# Patient Record
Sex: FEMALE | Race: BLACK OR AFRICAN AMERICAN | NOT HISPANIC OR LATINO
[De-identification: names, ages, dates, MRNs, and addresses within clinical notes are randomized per-mention and may not be internally consistent; named-entity substitution may affect disease eponyms.]

---

## 2017-03-02 ENCOUNTER — APPOINTMENT (OUTPATIENT)
Dept: INTERNAL MEDICINE | Facility: CLINIC | Age: 42
End: 2017-03-02

## 2017-03-02 VITALS
BODY MASS INDEX: 34.66 KG/M2 | OXYGEN SATURATION: 98 % | DIASTOLIC BLOOD PRESSURE: 84 MMHG | HEIGHT: 65 IN | WEIGHT: 208 LBS | SYSTOLIC BLOOD PRESSURE: 140 MMHG | HEART RATE: 80 BPM

## 2017-03-02 DIAGNOSIS — R51 HEADACHE: ICD-10-CM

## 2017-03-02 DIAGNOSIS — Z00.00 ENCOUNTER FOR GENERAL ADULT MEDICAL EXAMINATION W/OUT ABNORMAL FINDINGS: ICD-10-CM

## 2017-03-02 DIAGNOSIS — D64.9 ANEMIA, UNSPECIFIED: ICD-10-CM

## 2017-03-02 DIAGNOSIS — R10.33 PERIUMBILICAL PAIN: ICD-10-CM

## 2017-03-02 DIAGNOSIS — E07.89 OTHER SPECIFIED DISORDERS OF THYROID: ICD-10-CM

## 2017-03-02 DIAGNOSIS — M53.3 SACROCOCCYGEAL DISORDERS, NOT ELSEWHERE CLASSIFIED: ICD-10-CM

## 2017-03-04 PROBLEM — R51 HEADACHE: Status: ACTIVE | Noted: 2017-03-02

## 2017-03-04 LAB
25(OH)D3 SERPL-MCNC: 23.3 NG/ML
ALBUMIN SERPL ELPH-MCNC: 4.2 G/DL
ALP BLD-CCNC: 98 U/L
ALT SERPL-CCNC: 12 U/L
ANION GAP SERPL CALC-SCNC: 14 MMOL/L
AST SERPL-CCNC: 16 U/L
BILIRUB SERPL-MCNC: 0.3 MG/DL
BUN SERPL-MCNC: 12 MG/DL
CALCIUM SERPL-MCNC: 9.5 MG/DL
CHLORIDE SERPL-SCNC: 102 MMOL/L
CHOLEST SERPL-MCNC: 160 MG/DL
CHOLEST/HDLC SERPL: 3.3 RATIO
CO2 SERPL-SCNC: 22 MMOL/L
CREAT SERPL-MCNC: 0.88 MG/DL
GLUCOSE SERPL-MCNC: 91 MG/DL
HBA1C MFR BLD HPLC: 5.6 %
HDLC SERPL-MCNC: 48 MG/DL
HIV1+2 AB SPEC QL IA.RAPID: NONREACTIVE
LDLC SERPL CALC-MCNC: 94 MG/DL
POTASSIUM SERPL-SCNC: 4.5 MMOL/L
PROT SERPL-MCNC: 8.1 G/DL
SODIUM SERPL-SCNC: 138 MMOL/L
TRIGL SERPL-MCNC: 91 MG/DL
TSH SERPL-ACNC: 2.41 UIU/ML

## 2017-05-05 ENCOUNTER — APPOINTMENT (OUTPATIENT)
Dept: INTERNAL MEDICINE | Facility: CLINIC | Age: 42
End: 2017-05-05

## 2017-05-05 DIAGNOSIS — E55.9 VITAMIN D DEFICIENCY, UNSPECIFIED: ICD-10-CM

## 2018-04-24 ENCOUNTER — APPOINTMENT (OUTPATIENT)
Dept: OBGYN | Facility: CLINIC | Age: 43
End: 2018-04-24
Payer: COMMERCIAL

## 2018-04-24 VITALS
WEIGHT: 211.2 LBS | DIASTOLIC BLOOD PRESSURE: 76 MMHG | HEIGHT: 65 IN | SYSTOLIC BLOOD PRESSURE: 114 MMHG | BODY MASS INDEX: 35.19 KG/M2 | HEART RATE: 94 BPM

## 2018-04-24 PROCEDURE — 99213 OFFICE O/P EST LOW 20 MIN: CPT

## 2018-04-25 LAB
ALBUMIN SERPL ELPH-MCNC: 4.2 G/DL
ALP BLD-CCNC: 86 U/L
ALT SERPL-CCNC: 31 U/L
ANION GAP SERPL CALC-SCNC: 16 MMOL/L
AST SERPL-CCNC: 44 U/L
BILIRUB SERPL-MCNC: 0.4 MG/DL
BUN SERPL-MCNC: 14 MG/DL
CALCIUM SERPL-MCNC: 9.3 MG/DL
CHLORIDE SERPL-SCNC: 97 MMOL/L
CO2 SERPL-SCNC: 27 MMOL/L
CREAT SERPL-MCNC: 0.88 MG/DL
GLUCOSE SERPL-MCNC: 60 MG/DL
HBA1C MFR BLD HPLC: 5.6 %
POTASSIUM SERPL-SCNC: 4.4 MMOL/L
PROT SERPL-MCNC: 8.3 G/DL
SODIUM SERPL-SCNC: 140 MMOL/L

## 2018-04-30 ENCOUNTER — RESULT REVIEW (OUTPATIENT)
Age: 43
End: 2018-04-30

## 2018-04-30 LAB — BACTERIA UR CULT: ABNORMAL

## 2018-05-23 ENCOUNTER — APPOINTMENT (OUTPATIENT)
Dept: OBGYN | Facility: CLINIC | Age: 43
End: 2018-05-23
Payer: COMMERCIAL

## 2018-05-23 VITALS
SYSTOLIC BLOOD PRESSURE: 111 MMHG | HEART RATE: 83 BPM | DIASTOLIC BLOOD PRESSURE: 74 MMHG | BODY MASS INDEX: 34.16 KG/M2 | WEIGHT: 205 LBS | HEIGHT: 65 IN

## 2018-05-23 DIAGNOSIS — Z01.419 ENCOUNTER FOR GYNECOLOGICAL EXAMINATION (GENERAL) (ROUTINE) W/OUT ABNORMAL FINDINGS: ICD-10-CM

## 2018-05-23 DIAGNOSIS — N39.0 URINARY TRACT INFECTION, SITE NOT SPECIFIED: ICD-10-CM

## 2018-05-23 DIAGNOSIS — N63.10 UNSPECIFIED LUMP IN THE RIGHT BREAST, UNSPECIFIED QUADRANT: ICD-10-CM

## 2018-05-23 PROCEDURE — 99396 PREV VISIT EST AGE 40-64: CPT

## 2018-05-25 LAB — HPV HIGH+LOW RISK DNA PNL CVX: NOT DETECTED

## 2018-05-26 ENCOUNTER — APPOINTMENT (OUTPATIENT)
Dept: MAMMOGRAPHY | Facility: IMAGING CENTER | Age: 43
End: 2018-05-26

## 2018-05-26 ENCOUNTER — APPOINTMENT (OUTPATIENT)
Dept: ULTRASOUND IMAGING | Facility: IMAGING CENTER | Age: 43
End: 2018-05-26

## 2018-05-29 ENCOUNTER — APPOINTMENT (OUTPATIENT)
Dept: ULTRASOUND IMAGING | Facility: IMAGING CENTER | Age: 43
End: 2018-05-29
Payer: COMMERCIAL

## 2018-05-29 ENCOUNTER — APPOINTMENT (OUTPATIENT)
Dept: RADIOLOGY | Facility: IMAGING CENTER | Age: 43
End: 2018-05-29
Payer: COMMERCIAL

## 2018-05-29 ENCOUNTER — OUTPATIENT (OUTPATIENT)
Dept: OUTPATIENT SERVICES | Facility: HOSPITAL | Age: 43
LOS: 1 days | End: 2018-05-29
Payer: COMMERCIAL

## 2018-05-29 DIAGNOSIS — Z98.89 OTHER SPECIFIED POSTPROCEDURAL STATES: Chronic | ICD-10-CM

## 2018-05-29 DIAGNOSIS — Z00.8 ENCOUNTER FOR OTHER GENERAL EXAMINATION: ICD-10-CM

## 2018-05-29 LAB
BACTERIA UR CULT: ABNORMAL
CYTOLOGY CVX/VAG DOC THIN PREP: NORMAL

## 2018-05-29 PROCEDURE — 72100 X-RAY EXAM L-S SPINE 2/3 VWS: CPT | Mod: 26

## 2018-05-29 PROCEDURE — 76700 US EXAM ABDOM COMPLETE: CPT | Mod: 26

## 2018-05-29 PROCEDURE — 76536 US EXAM OF HEAD AND NECK: CPT | Mod: 26

## 2018-05-29 PROCEDURE — 76700 US EXAM ABDOM COMPLETE: CPT

## 2018-05-29 PROCEDURE — 76536 US EXAM OF HEAD AND NECK: CPT

## 2018-05-29 PROCEDURE — 72100 X-RAY EXAM L-S SPINE 2/3 VWS: CPT

## 2018-06-01 ENCOUNTER — OTHER (OUTPATIENT)
Age: 43
End: 2018-06-01

## 2019-03-21 ENCOUNTER — INPATIENT (INPATIENT)
Facility: HOSPITAL | Age: 44
LOS: 2 days | Discharge: HOME | End: 2019-03-24
Attending: HOSPITALIST | Admitting: HOSPITALIST
Payer: COMMERCIAL

## 2019-03-21 VITALS
DIASTOLIC BLOOD PRESSURE: 70 MMHG | HEART RATE: 108 BPM | SYSTOLIC BLOOD PRESSURE: 104 MMHG | RESPIRATION RATE: 17 BRPM | TEMPERATURE: 99 F | OXYGEN SATURATION: 98 %

## 2019-03-21 DIAGNOSIS — Z98.89 OTHER SPECIFIED POSTPROCEDURAL STATES: Chronic | ICD-10-CM

## 2019-03-21 LAB
ALBUMIN SERPL ELPH-MCNC: 4 G/DL — SIGNIFICANT CHANGE UP (ref 3.3–5)
ALP SERPL-CCNC: 99 U/L — SIGNIFICANT CHANGE UP (ref 40–120)
ALT FLD-CCNC: 18 U/L — SIGNIFICANT CHANGE UP (ref 4–33)
ANION GAP SERPL CALC-SCNC: 11 MMO/L — SIGNIFICANT CHANGE UP (ref 7–14)
ANISOCYTOSIS BLD QL: SLIGHT — SIGNIFICANT CHANGE UP
APPEARANCE UR: CLEAR — SIGNIFICANT CHANGE UP
AST SERPL-CCNC: 22 U/L — SIGNIFICANT CHANGE UP (ref 4–32)
BACTERIA # UR AUTO: HIGH
BASE EXCESS BLDV CALC-SCNC: 2.1 MMOL/L — SIGNIFICANT CHANGE UP
BASOPHILS # BLD AUTO: 0.02 K/UL — SIGNIFICANT CHANGE UP (ref 0–0.2)
BASOPHILS NFR BLD AUTO: 1 % — SIGNIFICANT CHANGE UP (ref 0–2)
BASOPHILS NFR SPEC: 1.7 % — SIGNIFICANT CHANGE UP (ref 0–2)
BILIRUB SERPL-MCNC: 0.4 MG/DL — SIGNIFICANT CHANGE UP (ref 0.2–1.2)
BILIRUB UR-MCNC: NEGATIVE — SIGNIFICANT CHANGE UP
BLASTS # FLD: 0 % — SIGNIFICANT CHANGE UP (ref 0–0)
BLOOD GAS VENOUS - CREATININE: 0.96 MG/DL — SIGNIFICANT CHANGE UP (ref 0.5–1.3)
BLOOD UR QL VISUAL: HIGH
BUN SERPL-MCNC: 11 MG/DL — SIGNIFICANT CHANGE UP (ref 7–23)
CALCIUM SERPL-MCNC: 9.2 MG/DL — SIGNIFICANT CHANGE UP (ref 8.4–10.5)
CHLORIDE BLDV-SCNC: 102 MMOL/L — SIGNIFICANT CHANGE UP (ref 96–108)
CHLORIDE SERPL-SCNC: 101 MMOL/L — SIGNIFICANT CHANGE UP (ref 98–107)
CO2 SERPL-SCNC: 24 MMOL/L — SIGNIFICANT CHANGE UP (ref 22–31)
COLOR SPEC: YELLOW — SIGNIFICANT CHANGE UP
CREAT SERPL-MCNC: 1.01 MG/DL — SIGNIFICANT CHANGE UP (ref 0.5–1.3)
EOSINOPHIL # BLD AUTO: 0.02 K/UL — SIGNIFICANT CHANGE UP (ref 0–0.5)
EOSINOPHIL NFR BLD AUTO: 1 % — SIGNIFICANT CHANGE UP (ref 0–6)
EOSINOPHIL NFR FLD: 1.8 % — SIGNIFICANT CHANGE UP (ref 0–6)
FLU A RESULT: NOT DETECTED — SIGNIFICANT CHANGE UP
FLU A RESULT: NOT DETECTED — SIGNIFICANT CHANGE UP
FLUAV AG NPH QL: NOT DETECTED — SIGNIFICANT CHANGE UP
FLUBV AG NPH QL: NOT DETECTED — SIGNIFICANT CHANGE UP
GAS PNL BLDV: 136 MMOL/L — SIGNIFICANT CHANGE UP (ref 136–146)
GIANT PLATELETS BLD QL SMEAR: PRESENT — SIGNIFICANT CHANGE UP
GLUCOSE BLDV-MCNC: 91 — SIGNIFICANT CHANGE UP (ref 70–99)
GLUCOSE SERPL-MCNC: 90 MG/DL — SIGNIFICANT CHANGE UP (ref 70–99)
GLUCOSE UR-MCNC: NEGATIVE — SIGNIFICANT CHANGE UP
HCO3 BLDV-SCNC: 25 MMOL/L — SIGNIFICANT CHANGE UP (ref 20–27)
HCT VFR BLD CALC: 39.2 % — SIGNIFICANT CHANGE UP (ref 34.5–45)
HCT VFR BLDV CALC: 38.7 % — SIGNIFICANT CHANGE UP (ref 34.5–45)
HGB BLD-MCNC: 12.1 G/DL — SIGNIFICANT CHANGE UP (ref 11.5–15.5)
HGB BLDV-MCNC: 12.6 G/DL — SIGNIFICANT CHANGE UP (ref 11.5–15.5)
HYPOCHROMIA BLD QL: SLIGHT — SIGNIFICANT CHANGE UP
IMM GRANULOCYTES NFR BLD AUTO: 0.5 % — SIGNIFICANT CHANGE UP (ref 0–1.5)
KETONES UR-MCNC: SIGNIFICANT CHANGE UP
LACTATE BLDV-MCNC: 1.1 MMOL/L — SIGNIFICANT CHANGE UP (ref 0.5–2)
LEUKOCYTE ESTERASE UR-ACNC: SIGNIFICANT CHANGE UP
LYMPHOCYTES # BLD AUTO: 0.47 K/UL — LOW (ref 1–3.3)
LYMPHOCYTES # BLD AUTO: 22.7 % — SIGNIFICANT CHANGE UP (ref 13–44)
LYMPHOCYTES NFR SPEC AUTO: 20 % — SIGNIFICANT CHANGE UP (ref 13–44)
MCHC RBC-ENTMCNC: 25.7 PG — LOW (ref 27–34)
MCHC RBC-ENTMCNC: 30.9 % — LOW (ref 32–36)
MCV RBC AUTO: 83.4 FL — SIGNIFICANT CHANGE UP (ref 80–100)
METAMYELOCYTES # FLD: 0 % — SIGNIFICANT CHANGE UP (ref 0–1)
MONOCYTES # BLD AUTO: 0.35 K/UL — SIGNIFICANT CHANGE UP (ref 0–0.9)
MONOCYTES NFR BLD AUTO: 16.9 % — HIGH (ref 2–14)
MONOCYTES NFR BLD: 16.5 % — HIGH (ref 2–9)
MYELOCYTES NFR BLD: 0 % — SIGNIFICANT CHANGE UP (ref 0–0)
NEUTROPHIL AB SER-ACNC: 54.8 % — SIGNIFICANT CHANGE UP (ref 43–77)
NEUTROPHILS # BLD AUTO: 1.2 K/UL — LOW (ref 1.8–7.4)
NEUTROPHILS NFR BLD AUTO: 57.9 % — SIGNIFICANT CHANGE UP (ref 43–77)
NEUTS BAND # BLD: 0 % — SIGNIFICANT CHANGE UP (ref 0–6)
NITRITE UR-MCNC: POSITIVE — HIGH
NRBC # FLD: 0 K/UL — LOW (ref 25–125)
OTHER - HEMATOLOGY %: 0 — SIGNIFICANT CHANGE UP
PCO2 BLDV: 48 MMHG — SIGNIFICANT CHANGE UP (ref 41–51)
PH BLDV: 7.37 PH — SIGNIFICANT CHANGE UP (ref 7.32–7.43)
PH UR: 6 — SIGNIFICANT CHANGE UP (ref 5–8)
PLATELET # BLD AUTO: 194 K/UL — SIGNIFICANT CHANGE UP (ref 150–400)
PLATELET COUNT - ESTIMATE: NORMAL — SIGNIFICANT CHANGE UP
PMV BLD: 10 FL — SIGNIFICANT CHANGE UP (ref 7–13)
PO2 BLDV: 38 MMHG — SIGNIFICANT CHANGE UP (ref 35–40)
POLYCHROMASIA BLD QL SMEAR: SLIGHT — SIGNIFICANT CHANGE UP
POTASSIUM BLDV-SCNC: 3.7 MMOL/L — SIGNIFICANT CHANGE UP (ref 3.4–4.5)
POTASSIUM SERPL-MCNC: 4.1 MMOL/L — SIGNIFICANT CHANGE UP (ref 3.5–5.3)
POTASSIUM SERPL-SCNC: 4.1 MMOL/L — SIGNIFICANT CHANGE UP (ref 3.5–5.3)
PROMYELOCYTES # FLD: 0 % — SIGNIFICANT CHANGE UP (ref 0–0)
PROT SERPL-MCNC: 8.1 G/DL — SIGNIFICANT CHANGE UP (ref 6–8.3)
PROT UR-MCNC: 20 — SIGNIFICANT CHANGE UP
RBC # BLD: 4.7 M/UL — SIGNIFICANT CHANGE UP (ref 3.8–5.2)
RBC # FLD: 13.4 % — SIGNIFICANT CHANGE UP (ref 10.3–14.5)
RBC CASTS # UR COMP ASSIST: SIGNIFICANT CHANGE UP (ref 0–?)
RSV RESULT: SIGNIFICANT CHANGE UP
RSV RNA RESP QL NAA+PROBE: SIGNIFICANT CHANGE UP
SAO2 % BLDV: 69.6 % — SIGNIFICANT CHANGE UP (ref 60–85)
SODIUM SERPL-SCNC: 136 MMOL/L — SIGNIFICANT CHANGE UP (ref 135–145)
SP GR SPEC: 1.02 — SIGNIFICANT CHANGE UP (ref 1–1.04)
SQUAMOUS # UR AUTO: SIGNIFICANT CHANGE UP
UROBILINOGEN FLD QL: NORMAL — SIGNIFICANT CHANGE UP
VARIANT LYMPHS # BLD: 5.2 % — SIGNIFICANT CHANGE UP
WBC # BLD: 2.07 K/UL — LOW (ref 3.8–10.5)
WBC # FLD AUTO: 2.07 K/UL — LOW (ref 3.8–10.5)
WBC UR QL: HIGH (ref 0–?)

## 2019-03-21 PROCEDURE — 71046 X-RAY EXAM CHEST 2 VIEWS: CPT | Mod: 26

## 2019-03-21 RX ORDER — CEFTRIAXONE 500 MG/1
1 INJECTION, POWDER, FOR SOLUTION INTRAMUSCULAR; INTRAVENOUS ONCE
Qty: 0 | Refills: 0 | Status: COMPLETED | OUTPATIENT
Start: 2019-03-21 | End: 2019-03-21

## 2019-03-21 RX ORDER — SODIUM CHLORIDE 9 MG/ML
1000 INJECTION INTRAMUSCULAR; INTRAVENOUS; SUBCUTANEOUS
Qty: 0 | Refills: 0 | Status: DISCONTINUED | OUTPATIENT
Start: 2019-03-21 | End: 2019-03-22

## 2019-03-21 RX ORDER — ACETAMINOPHEN 500 MG
650 TABLET ORAL ONCE
Qty: 0 | Refills: 0 | Status: COMPLETED | OUTPATIENT
Start: 2019-03-21 | End: 2019-03-21

## 2019-03-21 RX ORDER — CEFTRIAXONE 500 MG/1
1 INJECTION, POWDER, FOR SOLUTION INTRAMUSCULAR; INTRAVENOUS
Qty: 0 | Refills: 0 | Status: COMPLETED | OUTPATIENT
Start: 2019-03-22 | End: 2019-03-22

## 2019-03-21 RX ORDER — SODIUM CHLORIDE 9 MG/ML
1000 INJECTION INTRAMUSCULAR; INTRAVENOUS; SUBCUTANEOUS ONCE
Qty: 0 | Refills: 0 | Status: COMPLETED | OUTPATIENT
Start: 2019-03-21 | End: 2019-03-21

## 2019-03-21 RX ORDER — CEFTRIAXONE 500 MG/1
1 INJECTION, POWDER, FOR SOLUTION INTRAMUSCULAR; INTRAVENOUS EVERY 24 HOURS
Qty: 0 | Refills: 0 | Status: DISCONTINUED | OUTPATIENT
Start: 2019-03-21 | End: 2019-03-21

## 2019-03-21 RX ADMIN — CEFTRIAXONE 100 GRAM(S): 500 INJECTION, POWDER, FOR SOLUTION INTRAMUSCULAR; INTRAVENOUS at 17:10

## 2019-03-21 RX ADMIN — SODIUM CHLORIDE 1000 MILLILITER(S): 9 INJECTION INTRAMUSCULAR; INTRAVENOUS; SUBCUTANEOUS at 14:15

## 2019-03-21 RX ADMIN — Medication 650 MILLIGRAM(S): at 21:37

## 2019-03-21 RX ADMIN — Medication 650 MILLIGRAM(S): at 14:15

## 2019-03-21 RX ADMIN — SODIUM CHLORIDE 150 MILLILITER(S): 9 INJECTION INTRAMUSCULAR; INTRAVENOUS; SUBCUTANEOUS at 21:38

## 2019-03-21 NOTE — ED PROVIDER NOTE - PMH
Iron Deficiency Anemia    Irregular Menstruation    Menstrual disorder    Obesity (BMI 30.0-34.9)    Seasonal Allergies

## 2019-03-21 NOTE — ED PROVIDER NOTE - CLINICAL SUMMARY MEDICAL DECISION MAKING FREE TEXT BOX
Flu-like symptoms x 2 days & UTI symptoms x 1 week. History of chronic UTIs. Borderline febrile & tachycardic but HD stable and well-appearing. Will assess for UTI and flu. IVFs, Tylenol, and reassess.

## 2019-03-21 NOTE — ED ADULT TRIAGE NOTE - CHIEF COMPLAINT QUOTE
A&OX3 c/o flu like symptoms that began two days ago reports achiness in right shoulder, eyes, and headache pt also states that her urine smells and is being treated for it but has had no relief denies n/v/d cp sob fever chills

## 2019-03-21 NOTE — ED PROVIDER NOTE - OBJECTIVE STATEMENT
43F PMHx of hysterectomy for vaginal bleeding and chronic UTIs p/w subjective fever/chills/headache x 2 days and a dry cough that developed today. Denies any chest pain, SOB, neck stiffness, abdominal pain, N/V/D, numbness, weakness. Denies any sick contacts or travel history. Also of note, the has had chronic UTIs requiring multiple courses of antibiotics over the past couple of years. Reports chronic odorous urine and dysuria worsening over the past week.

## 2019-03-21 NOTE — ED PROVIDER NOTE - ATTENDING CONTRIBUTION TO CARE
Gong: I have seen and examined the patient face to face, have reviewed and addended the HPI, PE and a/p as necessary.     42 yo F with chronic UTIs a/w fever chills headache and lower back pain.  Pt reports having increasing lower abd pain pain/suprapubic pain, x 3 days with dysuria.  Reports having chills and fever over the past 2 days, now developed cough today.  Reports having headache that started yesterday and continued today with no nausea or vomiting, or dizziness, Reports chronic UTIs requiring multiple courses of antibiotics over the past couple of years. Reports chronic odorous urine and dysuria worsening over the past week.    GEN - uncomfortable appearing, febrile. CARD -s1s2, RRR, no M,G,R; PULM - CTA b/l, symmetric breath sounds; ABD:  +BS, TTP over lower abd, with lower back soreness, soft, no guarding, no rebound, no masses; BACK: no CVA tenderness, Normal  spine; EXT: symmetric pulses, 2+ dp, capillary refill < 2 seconds, no clubbing, no cyanosis, no edema NEURO: alert, CN 2-12 intact, reflexes nl, sensation nl, coordination nl, finger to nose nl, romberg negative, motor 5/5 RUE/LUE/RLE/LLE/EHL/Plantar flexion, no pronator drift, gait nl    42yo F a/w possibly pyelonephritis vs flu given febrile illness, will obtain blood cultures, cbc, cmp, ivf, lactate, will reassess, if no clinical improvement consider cdu for further observation; ceftriaxone if uti/pyelo.

## 2019-03-21 NOTE — ED CDU PROVIDER INITIAL DAY NOTE - ATTENDING CONTRIBUTION TO CARE
Bladimir: I have seen and examined the patient face to face, have reviewed and addended the HPI, PE and a/p as necessary.    42 yo F with chronic UTIs a/w fever chills headache and lower back pain.  Pt reports having increasing lower abd pain pain/suprapubic pain, x 3 days with dysuria.  Reports having chills and fever over the past 2 days, now developed cough today.  Reports having headache that started yesterday and continued today with no nausea or vomiting, or dizziness, Reports chronic UTIs requiring multiple courses of antibiotics over the past couple of years. Reports chronic odorous urine and dysuria worsening over the past week.    GEN - uncomfortable appearing, febrile. CARD -s1s2, RRR, no M,G,R; PULM - CTA b/l, symmetric breath sounds; ABD:  +BS, TTP over lower abd, with lower back soreness, soft, no guarding, no rebound, no masses; BACK: no CVA tenderness, Normal  spine; EXT: symmetric pulses, 2+ dp, capillary refill < 2 seconds, no clubbing, no cyanosis, no edema NEURO: alert, CN 2-12 intact, reflexes nl, sensation nl, coordination nl, finger to nose nl, romberg negative, motor 5/5 RUE/LUE/RLE/LLE/EHL/Plantar flexion, no pronator drift, gait nl    42yo F a/w possibly pyelonephritis vs flu given febrile illness, will obtain blood cultures, cbc, cmp, ivf, lactate, noted to have minimal improvement with IVF and IV antibiotics, found to have pyelonephritis.  Will admit to CDU for IV antibiotics and frequent clinical reassessments.

## 2019-03-21 NOTE — ED CDU PROVIDER INITIAL DAY NOTE - OBJECTIVE STATEMENT
43yF w/pmhx hysterectomy and chronic UTIs p/w flu like symptoms. Pt states over the past few days she has had subjective fever/chills, headache, lightheadedness and dry cough. Pt states she also has urinary frequency, dysuria and foul smelling urine, similar to prior UTIs. Pt has hx of headaches that are similar in nature to prior headaches. Also with acute on chronic low back pain. Pt denies recent travel, abdominal pain, nausea, vomiting, diarrhea, neck stiffness, dizziness, cp, sob or any other concerns.  In ED pt found to have UTI, still not feeling well (lightheaded/mild headache) sent to CDU for IV abx, am labs and symptom control

## 2019-03-21 NOTE — ED CDU PROVIDER INITIAL DAY NOTE - PSH
H/O  section    H/O dilation and curettage    H/O dilation and curettage    H/O unilateral oophorectomy  2005  Myomectomy   &   Tonsillectomy

## 2019-03-21 NOTE — ED CDU PROVIDER INITIAL DAY NOTE - MEDICAL DECISION MAKING DETAILS
43yF w/pmhx hysterectomy and chronic UTIs p/w flu like symptoms and urinary complaints. Found to have UTI, sent to CDU for IV abx and symptom control

## 2019-03-21 NOTE — ED PROVIDER NOTE - NS ED ROS FT
GENERAL: +F/C  EYES: no change in vision  HEENT: no trouble swallowing or speaking  CARDIAC: no chest pain  PULMONARY: +dry cough. no SOB  GI: no abdominal pain, no nausea, no vomiting, no diarrhea or constipation  : +acute on chronic odorous urine and dysuria   SKIN: no rashes  NEURO: +headache. no numbness, tingling, weakness  MSK: No joint pain     ~Rich Lau PGY1

## 2019-03-21 NOTE — ED PROVIDER NOTE - PHYSICAL EXAMINATION
Gen: AAOx3, non-toxic  Head: NCAT  HEENT: EOMI, oral mucosa moist, normal conjunctiva  Lung: CTAB, no respiratory distress, no wheezes/rhonchi/rales B/L, speaking in full sentences  CV: RRR, no murmurs, rubs or gallops  Abd: soft, NTND, no guarding, no CVA tenderness  MSK: no visible deformities  Neuro: No focal sensory or motor deficits, normal CN, negative kerning's and Brudzinski signs   Skin: Warm, well perfused, no rash  Psych: normal affect.     ~Rich Lau PGY1

## 2019-03-22 DIAGNOSIS — D70.9 NEUTROPENIA, UNSPECIFIED: ICD-10-CM

## 2019-03-22 DIAGNOSIS — I49.9 CARDIAC ARRHYTHMIA, UNSPECIFIED: ICD-10-CM

## 2019-03-22 DIAGNOSIS — Z29.9 ENCOUNTER FOR PROPHYLACTIC MEASURES, UNSPECIFIED: ICD-10-CM

## 2019-03-22 DIAGNOSIS — N39.0 URINARY TRACT INFECTION, SITE NOT SPECIFIED: ICD-10-CM

## 2019-03-22 DIAGNOSIS — A41.9 SEPSIS, UNSPECIFIED ORGANISM: ICD-10-CM

## 2019-03-22 DIAGNOSIS — D50.9 IRON DEFICIENCY ANEMIA, UNSPECIFIED: ICD-10-CM

## 2019-03-22 LAB
ANION GAP SERPL CALC-SCNC: 10 MMO/L — SIGNIFICANT CHANGE UP (ref 7–14)
B PERT DNA SPEC QL NAA+PROBE: NOT DETECTED — SIGNIFICANT CHANGE UP
BASOPHILS # BLD AUTO: 0.01 K/UL — SIGNIFICANT CHANGE UP (ref 0–0.2)
BASOPHILS NFR BLD AUTO: 0.7 % — SIGNIFICANT CHANGE UP (ref 0–2)
BUN SERPL-MCNC: 9 MG/DL — SIGNIFICANT CHANGE UP (ref 7–23)
C PNEUM DNA SPEC QL NAA+PROBE: NOT DETECTED — SIGNIFICANT CHANGE UP
CALCIUM SERPL-MCNC: 8 MG/DL — LOW (ref 8.4–10.5)
CHLORIDE SERPL-SCNC: 107 MMOL/L — SIGNIFICANT CHANGE UP (ref 98–107)
CO2 SERPL-SCNC: 24 MMOL/L — SIGNIFICANT CHANGE UP (ref 22–31)
CREAT SERPL-MCNC: 0.79 MG/DL — SIGNIFICANT CHANGE UP (ref 0.5–1.3)
EOSINOPHIL # BLD AUTO: 0.08 K/UL — SIGNIFICANT CHANGE UP (ref 0–0.5)
EOSINOPHIL NFR BLD AUTO: 5.7 % — SIGNIFICANT CHANGE UP (ref 0–6)
FLUAV H1 2009 PAND RNA SPEC QL NAA+PROBE: NOT DETECTED — SIGNIFICANT CHANGE UP
FLUAV H1 RNA SPEC QL NAA+PROBE: NOT DETECTED — SIGNIFICANT CHANGE UP
FLUAV H3 RNA SPEC QL NAA+PROBE: NOT DETECTED — SIGNIFICANT CHANGE UP
FLUAV SUBTYP SPEC NAA+PROBE: NOT DETECTED — SIGNIFICANT CHANGE UP
FLUBV RNA SPEC QL NAA+PROBE: NOT DETECTED — SIGNIFICANT CHANGE UP
GLUCOSE SERPL-MCNC: 102 MG/DL — HIGH (ref 70–99)
HADV DNA SPEC QL NAA+PROBE: NOT DETECTED — SIGNIFICANT CHANGE UP
HBA1C BLD-MCNC: 5.2 % — SIGNIFICANT CHANGE UP (ref 4–5.6)
HCOV PNL SPEC NAA+PROBE: SIGNIFICANT CHANGE UP
HCT VFR BLD CALC: 35.5 % — SIGNIFICANT CHANGE UP (ref 34.5–45)
HGB BLD-MCNC: 10.9 G/DL — LOW (ref 11.5–15.5)
HMPV RNA SPEC QL NAA+PROBE: NOT DETECTED — SIGNIFICANT CHANGE UP
HPIV1 RNA SPEC QL NAA+PROBE: NOT DETECTED — SIGNIFICANT CHANGE UP
HPIV2 RNA SPEC QL NAA+PROBE: NOT DETECTED — SIGNIFICANT CHANGE UP
HPIV3 RNA SPEC QL NAA+PROBE: NOT DETECTED — SIGNIFICANT CHANGE UP
HPIV4 RNA SPEC QL NAA+PROBE: NOT DETECTED — SIGNIFICANT CHANGE UP
IMM GRANULOCYTES NFR BLD AUTO: 0.7 % — SIGNIFICANT CHANGE UP (ref 0–1.5)
LYMPHOCYTES # BLD AUTO: 0.49 K/UL — LOW (ref 1–3.3)
LYMPHOCYTES # BLD AUTO: 35 % — SIGNIFICANT CHANGE UP (ref 13–44)
MANUAL SMEAR VERIFICATION: SIGNIFICANT CHANGE UP
MCHC RBC-ENTMCNC: 26.3 PG — LOW (ref 27–34)
MCHC RBC-ENTMCNC: 30.7 % — LOW (ref 32–36)
MCV RBC AUTO: 85.5 FL — SIGNIFICANT CHANGE UP (ref 80–100)
MONOCYTES # BLD AUTO: 0.19 K/UL — SIGNIFICANT CHANGE UP (ref 0–0.9)
MONOCYTES NFR BLD AUTO: 13.6 % — SIGNIFICANT CHANGE UP (ref 2–14)
NEUTROPHILS # BLD AUTO: 0.62 K/UL — LOW (ref 1.8–7.4)
NEUTROPHILS NFR BLD AUTO: 44.3 % — SIGNIFICANT CHANGE UP (ref 43–77)
NRBC # FLD: 0 K/UL — LOW (ref 25–125)
PLATELET # BLD AUTO: 151 K/UL — SIGNIFICANT CHANGE UP (ref 150–400)
PMV BLD: 10 FL — SIGNIFICANT CHANGE UP (ref 7–13)
POTASSIUM SERPL-MCNC: 3.7 MMOL/L — SIGNIFICANT CHANGE UP (ref 3.5–5.3)
POTASSIUM SERPL-SCNC: 3.7 MMOL/L — SIGNIFICANT CHANGE UP (ref 3.5–5.3)
RBC # BLD: 4.15 M/UL — SIGNIFICANT CHANGE UP (ref 3.8–5.2)
RBC # FLD: 13.7 % — SIGNIFICANT CHANGE UP (ref 10.3–14.5)
RSV RNA SPEC QL NAA+PROBE: NOT DETECTED — SIGNIFICANT CHANGE UP
RV+EV RNA SPEC QL NAA+PROBE: NOT DETECTED — SIGNIFICANT CHANGE UP
SODIUM SERPL-SCNC: 141 MMOL/L — SIGNIFICANT CHANGE UP (ref 135–145)
SPECIMEN SOURCE: SIGNIFICANT CHANGE UP
SPECIMEN SOURCE: SIGNIFICANT CHANGE UP
WBC # BLD: 1.4 K/UL — LOW (ref 3.8–10.5)
WBC # FLD AUTO: 1.4 K/UL — LOW (ref 3.8–10.5)

## 2019-03-22 PROCEDURE — 76770 US EXAM ABDO BACK WALL COMP: CPT | Mod: 26

## 2019-03-22 PROCEDURE — 99222 1ST HOSP IP/OBS MODERATE 55: CPT | Mod: GC

## 2019-03-22 PROCEDURE — 99223 1ST HOSP IP/OBS HIGH 75: CPT | Mod: GC

## 2019-03-22 RX ORDER — CEFTRIAXONE 500 MG/1
1 INJECTION, POWDER, FOR SOLUTION INTRAMUSCULAR; INTRAVENOUS EVERY 24 HOURS
Qty: 0 | Refills: 0 | Status: DISCONTINUED | OUTPATIENT
Start: 2019-03-23 | End: 2019-03-24

## 2019-03-22 RX ORDER — ACETAMINOPHEN 500 MG
650 TABLET ORAL EVERY 6 HOURS
Qty: 0 | Refills: 0 | Status: DISCONTINUED | OUTPATIENT
Start: 2019-03-22 | End: 2019-03-24

## 2019-03-22 RX ORDER — IBUPROFEN 200 MG
600 TABLET ORAL ONCE
Qty: 0 | Refills: 0 | Status: COMPLETED | OUTPATIENT
Start: 2019-03-22 | End: 2019-03-22

## 2019-03-22 RX ORDER — CEPHALEXIN 500 MG
1 CAPSULE ORAL
Qty: 30 | Refills: 0 | OUTPATIENT
Start: 2019-03-22 | End: 2019-03-31

## 2019-03-22 RX ADMIN — SODIUM CHLORIDE 150 MILLILITER(S): 9 INJECTION INTRAMUSCULAR; INTRAVENOUS; SUBCUTANEOUS at 04:30

## 2019-03-22 RX ADMIN — SODIUM CHLORIDE 150 MILLILITER(S): 9 INJECTION INTRAMUSCULAR; INTRAVENOUS; SUBCUTANEOUS at 11:58

## 2019-03-22 RX ADMIN — CEFTRIAXONE 100 GRAM(S): 500 INJECTION, POWDER, FOR SOLUTION INTRAMUSCULAR; INTRAVENOUS at 06:54

## 2019-03-22 RX ADMIN — Medication 600 MILLIGRAM(S): at 06:54

## 2019-03-22 NOTE — H&P ADULT - HISTORY OF PRESENT ILLNESS
Pt is a 42yo F with PMHx of chronic UTIs, abnormal uterine bleeding (s/p hysterectomy), ovarian cyst and numerous miscarriages (s/p salpingooophorectomy presenting with pain on urination for 3 days. Patient states that her urine has been foul smelling, cloudy appearing. She admits to body chills and night sweats during this time. Patient admits to suprapubic and lower abdominal pain during this time.  Patient admits to frequent UTIs, most recently treated with abx 7 weeks ago, unknown which antibiotic was prescribed. Patient denies any hospitalizations for this. Patient denies hematuria, nausea, vomiting, d/c.     In the ED: tempurature 99 (Tmax 101.3), , /70, saturating 98% on RA. Patient's labs were notable for leukopenia (2.07), ANC 1200, CMP unremarkable,  lactate WNL, UA grossly positive. RVP Neg, s/p ~2L LR, tylenol  650 x2, ibuprofen 600 x1, ceftriaxone 1g x. Patient transferred to CDU for further monitoring, leukopenia worsening.

## 2019-03-22 NOTE — H&P ADULT - NSHPLABSRESULTS_GEN_ALL_CORE
10.9   1.40  )-----------( 151      ( 22 Mar 2019 06:00 )             35.5       03-22    141  |  107  |  9   ----------------------------<  102<H>  3.7   |  24  |  0.79    Ca    8.0<L>      22 Mar 2019 06:00    TPro  8.1  /  Alb  4.0  /  TBili  0.4  /  DBili  x   /  AST  22  /  ALT  18  /  AlkPhos  99  03-21      LIVER FUNCTIONS - ( 21 Mar 2019 14:14 )  Alb: 4.0 g/dL / Pro: 8.1 g/dL / ALK PHOS: 99 u/L / ALT: 18 u/L / AST: 22 u/L / GGT: x             Culture - Blood (collected 21 Mar 2019 14:27)  Source: BLOOD PERIPHERAL  Preliminary Report (22 Mar 2019 14:24):    NO ORGANISMS ISOLATED    NO ORGANISMS ISOLATED AT 24 HOURS    Culture - Blood (collected 21 Mar 2019 14:27)  Source: BLOOD VENOUS  Preliminary Report (22 Mar 2019 14:24):    NO ORGANISMS ISOLATED    NO ORGANISMS ISOLATED AT 24 HOURS        Urinalysis Basic - ( 21 Mar 2019 13:47 )    Color: YELLOW / Appearance: CLEAR / S.023 / pH: 6.0  Gluc: NEGATIVE / Ketone: SMALL  / Bili: NEGATIVE / Urobili: NORMAL   Blood: LARGE / Protein: 20 / Nitrite: POSITIVE   Leuk Esterase: SMALL / RBC: 3-5 / WBC 11-25   Sq Epi: FEW / Non Sq Epi: x / Bacteria: MANY

## 2019-03-22 NOTE — ED CDU PROVIDER SUBSEQUENT DAY NOTE - HISTORY
CDU Initial Note HPI: "43yF w/pmhx hysterectomy and chronic UTIs p/w flu like symptoms. Pt states over the past few days she has had subjective fever/chills, headache, lightheadedness and dry cough. Pt states she also has urinary frequency, dysuria and foul smelling urine, similar to prior UTIs. Pt has hx of headaches that are similar in nature to prior headaches. Also with acute on chronic low back pain. Pt denies recent travel, abdominal pain, nausea, vomiting, diarrhea, neck stiffness, dizziness, cp, sob or any other concerns.  In ED pt found to have UTI, still not feeling well (lightheaded/mild headache) sent to CDU for IV abx, am labs and symptom control"  In the interim, pt objectively noted to be resting comfortably; no issues or c/o thus far.  AM labs ordered for 0600 hrs.

## 2019-03-22 NOTE — ED CDU PROVIDER SUBSEQUENT DAY NOTE - ATTENDING CONTRIBUTION TO CARE
43F h/o anemia, came to ED with flu-like sx.  Flu swab was negative.  PT also with some dysuria and foul smelling urine.  Rx’ed CTX, another dose, rx abx.  Feeling better here, no complaints; however found to be neutropenic on AM labs with .  Plan - heme eval, check full RVP, cultures, reassess.  VS:  unremarkable    GEN - NAD; well appearing; A+O x3   HEAD - NC/AT     ENT - PEERL, EOMI, mucous membranes  moist , no discharge      NECK: Neck supple, non-tender without lymphadenopathy, no masses, no JVD  PULM - CTA b/l,  symmetric breath sounds  COR -  normal heart sounds    ABD - , ND, NT, soft,  BACK - no CVA tenderness, nontender spine     EXTREMS - no edema, no deformity, warm and well perfused    SKIN - no rash or bruising      NEUROLOGIC - alert, CN 2-12 intact, sensation nl, motor no focal deficit.

## 2019-03-22 NOTE — H&P ADULT - PROBLEM SELECTOR PLAN 2
- chronic UTIs  - c/w ceftriaxone; s/p 2 doses in ED, next dose tomorrow (likely for 3 day course)    - if worsening suprapubic pain/ febrile, will broaded coverage to zosyn

## 2019-03-22 NOTE — H&P ADULT - NSICDXPASTSURGICALHX_GEN_ALL_CORE_FT
PAST SURGICAL HISTORY:  H/O  section     H/O dilation and curettage     H/O dilation and curettage     H/O unilateral oophorectomy 2005    Myomectomy  & 2008    Tonsillectomy

## 2019-03-22 NOTE — ED CDU PROVIDER SUBSEQUENT DAY NOTE - CONSTITUTIONAL, MLM
Well appearing, well nourished, awake, alert, oriented to person, place, time/situation and in no apparent distress.  Pt. verbalizing clearly and effortlessly. normal...

## 2019-03-22 NOTE — H&P ADULT - NSICDXPASTMEDICALHX_GEN_ALL_CORE_FT
PAST MEDICAL HISTORY:  Chronic urinary tract infection     Iron Deficiency Anemia     Irregular Menstruation     Menstrual disorder     Obesity (BMI 30.0-34.9)     Seasonal Allergies

## 2019-03-22 NOTE — H&P ADULT - ATTENDING COMMENTS
Agree with Housestaff Note Above, edits made where appropriate, case discussed with housestaff    Patient seen and examined. This is a 43F being admitted for sepsis 2/2 UTI c/b neutropenia. Currently c/o malaise however fevers and dysuria improved. No hx of "low white blood cells" as per patient  VS and PE above  Labs and Imaging Reviewed  Plan:  - c/w abx for UTI  - workup for neutropenia as per Heme recommendations   Rest of plan above    Venkat Salcedo DO  Division of Hospital Medicine  Pager #: [phone]

## 2019-03-22 NOTE — ED CDU PROVIDER DISPOSITION NOTE - CLINICAL COURSE
43yF w/pmhx hysterectomy and chronic UTIs p/w flu like symptoms. Pt states over the past few days she has had subjective fever/chills, headache, lightheadedness and dry cough. Pt states she also has urinary frequency, dysuria and foul smelling urine, similar to prior UTIs. Pt has hx of headaches that are similar in nature to prior headaches. Also with acute on chronic low back pain. Pt denies recent travel, abdominal pain, nausea, vomiting, diarrhea, neck stiffness, dizziness, cp, sob or any other concerns.  In ED pt found to have UTI, still not feeling well (lightheaded/mild headache) sent to CDU for IV abx, am labs and symptom control"  In the interim, pt objectively noted to be resting comfortably; no issues or c/o thus far.  AM labs ordered for 0600 hrs. In cdu pt doing well, however noted to be neutropenic, seen by steve and advised admission for further workup/monitoring.

## 2019-03-22 NOTE — CONSULT NOTE ADULT - ASSESSMENT
43 F with medical history of obesity and chronic UTIs who is currently being treated for UTI and 43 F with medical history of obesity and chronic UTIs who is currently being treated for UTI/clinical pyelonephritis> Hematology consulted neutropenia:    #Neutropenia:  - borderline severe with ANC of 620 although it appears to be acute drop because ANC yesterday was 1200  - patient denies history of cytopenias and trend of labs going back to 2008 does not show leukopenia/neutropenia  - may possibly be due in setting of current infection but differential exists of autoimmune etiology vs medication side effect. The only medication patient acknowledges to be on recently is antibiotics 1 month ago.  - recommend sending MIGUELINA, RF, anti-granulocyte antibodies, HIV, hepatitis, EBV  - will review PBS  - would recommend keeping patient to assure neutrophil count does not drop further

## 2019-03-22 NOTE — H&P ADULT - PROBLEM SELECTOR PLAN 6
- DVT prophylaxis: IMPROVE <2, no chemical dvt prophylaxis required  - Dispo: pending medical management

## 2019-03-22 NOTE — ED CDU PROVIDER SUBSEQUENT DAY NOTE - PROGRESS NOTE DETAILS
pt reports feeling improved, neutropenia noted on labs, heme called, will see. cultures pending, rvp pending.

## 2019-03-22 NOTE — H&P ADULT - NSHPREVIEWOFSYSTEMS_GEN_ALL_CORE
REVIEW OF SYSTEMS:  CONSTITUTIONAL: No weakness, + fevers + chills  EYES/ENT: No visual changes;  No dysphagia  NECK: No pain or stiffness  RESPIRATORY: + cough, no  wheezing, hemoptysis; No shortness of breath  CARDIOVASCULAR: No chest pain or palpitations; No lower extremity edema  GASTROINTESTINAL: + abdominal pain. No nausea, vomiting, or hematemesis; No diarrhea or constipation. No melena or hematochezia.  BACK: No back pain  GENITOURINARY: + dysuria, frequency no hematuria  NEUROLOGICAL: No numbness or weakness  SKIN: No itching, burning, rashes, or lesions   All other review of systems is negative unless indicated above.

## 2019-03-22 NOTE — CONSULT NOTE ADULT - SUBJECTIVE AND OBJECTIVE BOX
Hematology Consult Note    HPI:     43yF w/pmhx hysterectomy and chronic UTIs p/w flu like symptoms. Pt states over the past few days she has had subjective fever/chills, headache, lightheadedness and dry cough. Pt states she also has urinary frequency, dysuria and foul smelling urine, similar to prior UTIs. Pt has hx of headaches that are similar in nature to prior headaches. Also with acute on chronic low back pain. Pt denies recent travel, abdominal pain, nausea, vomiting, diarrhea, neck stiffness, dizziness, cp, sob or any other concerns. In ED pt found to have UTI, still not feeling well (lightheaded/mild headache) sent to CDU for IV abx, am labs and symptom control	        Allergies    morphine (Unknown)  Season allergies - nasal congestion, itchy watery eyes (Unknown)    Intolerances        MEDICATIONS  (STANDING):  sodium chloride 0.9%. 1000 milliLiter(s) (150 mL/Hr) IV Continuous <Continuous>    MEDICATIONS  (PRN):      PAST MEDICAL & SURGICAL HISTORY:  Obesity (BMI 30.0-34.9)  Menstrual disorder  Seasonal Allergies  Irregular Menstruation  Iron Deficiency Anemia  H/O dilation and curettage  H/O unilateral oophorectomy:   H/O dilation and curettage:   H/O  section  Myomectomy:  &amp;   Tonsillectomy      FAMILY HISTORY:  No pertinent family history in first degree relatives      SOCIAL HISTORY: No EtOH, no tobacco    REVIEW OF SYSTEMS:    CONSTITUTIONAL: No weakness, fevers or chills  EYES/ENT: No visual changes;  No vertigo or throat pain   NECK: No pain or stiffness  RESPIRATORY: No cough, wheezing, hemoptysis; No shortness of breath  CARDIOVASCULAR: No chest pain or palpitations  GASTROINTESTINAL: No abdominal or epigastric pain. No nausea, vomiting, or hematemesis; No diarrhea or constipation. No melena or hematochezia.  GENITOURINARY: No dysuria, frequency or hematuria  NEUROLOGICAL: No numbness or weakness  SKIN: No itching, burning, rashes, or lesions   All other review of systems is negative unless indicated above.        T(F): 99.3 (19 @ 10:13), Max: 99.3 (19 @ 10:13)  HR: 81 (19 @ 10:13)  BP: 135/72 (19 @ 10:13)  RR: 18 (19 @ 10:13)  SpO2: 99% (19 @ 10:13)  Wt(kg): --    GENERAL: NAD, well-developed  HEAD:  Atraumatic, Normocephalic  EYES: EOMI, PERRLA, conjunctiva and sclera clear  NECK: Supple, No JVD  CHEST/LUNG: Clear to auscultation bilaterally; No wheeze  HEART: Regular rate and rhythm; No murmurs, rubs, or gallops  ABDOMEN: Soft, Nontender, Nondistended; Bowel sounds present  EXTREMITIES:  2+ Peripheral Pulses, No clubbing, cyanosis, or edema  NEUROLOGY: non-focal  SKIN: No rashes or lesions                          10.9   1.40  )-----------( 151      ( 22 Mar 2019 06:00 )             35.5           141  |  107  |  9   ----------------------------<  102<H>  3.7   |  24  |  0.79    Ca    8.0<L>      22 Mar 2019 06:00    TPro  8.1  /  Alb  4.0  /  TBili  0.4  /  DBili  x   /  AST  22  /  ALT  18  /  AlkPhos  99

## 2019-03-22 NOTE — H&P ADULT - ASSESSMENT
Pt is a 44yo F with PMHx of chronic UTIs, abnormal uterine bleeding (s/p hysterectomy), ovarian cyst and numerous miscarriages (s/p salpingooophorectomy presenting with pain on urination for 3 days, febrile, grossly +UA likely recurrent UTI, found to be neutropenic

## 2019-03-22 NOTE — CONSULT NOTE ADULT - ATTENDING COMMENTS
Patient has UTI and severe neutropenia.  Recommend that she be admitted for inpatient observation of count trends and additional diagnostic testing.

## 2019-03-22 NOTE — H&P ADULT - PROBLEM SELECTOR PLAN 1
- tachycardic, neutropenia, febrile likely 2/2 to UTI; lactate WNL   - blood cx NGTD  - urine cx pending  - s/p IVF 2L LR  - encourage PO hydration

## 2019-03-22 NOTE — H&P ADULT - NSHPPHYSICALEXAM_GEN_ALL_CORE
Vital Signs Last 24 Hrs  T(C): 36.5 (22 Mar 2019 18:30), Max: 37.4 (22 Mar 2019 10:13)  T(F): 97.7 (22 Mar 2019 18:30), Max: 99.3 (22 Mar 2019 10:13)  HR: 76 (22 Mar 2019 18:30) (71 - 85)  BP: 125/71 (22 Mar 2019 18:30) (101/50 - 135/72)  BP(mean): --  RR: 16 (22 Mar 2019 18:30) (16 - 18)  SpO2: 100% (22 Mar 2019 18:30) (98% - 100%)    PHYSICAL EXAM:  GENERAL: NAD, resting comfortable   HEAD:  Atraumatic, Normocephalic  EYES: EOMI, PERRLA, conjunctiva and sclera clear  NECK: Supple, No JVD  CHEST/LUNG: Clear to auscultation bilaterally; No rhonchis, rales, or wheezing  HEART: Regular rate and rhythm; S1, S2; No murmurs, rubs, or gallops  ABDOMEN: Soft, Nondistended; Normoactive bowel sounds, TTP to B/L lower quadrants  URO: + suprapubic tenderness, denies CVA tenderness   EXTREMITIES:  2+ Peripheral Pulses, No clubbing, cyanosis, or edema  PSYCH: A&Ox3  NEUROLOGY: non-focal  SKIN: No rashes or lesions

## 2019-03-22 NOTE — H&P ADULT - PROBLEM SELECTOR PLAN 3
- no hx of neutropenia, autoimmune disease, ca, no new medications   - likely in setting of acute infectious process  - neutropenia worsening; 2.07 -> 1.40; ANC 1200 -> 620  - CTM with serial CBCs  - heme onc c/s: recs appreciated; recommending MIGUELINA, RF, anti-granulocyte abs, HIV, hepatitis, EBV

## 2019-03-23 ENCOUNTER — TRANSCRIPTION ENCOUNTER (OUTPATIENT)
Age: 44
End: 2019-03-23

## 2019-03-23 DIAGNOSIS — D70.9 NEUTROPENIA, UNSPECIFIED: ICD-10-CM

## 2019-03-23 LAB
ALBUMIN SERPL ELPH-MCNC: 3.5 G/DL — SIGNIFICANT CHANGE UP (ref 3.3–5)
ALP SERPL-CCNC: 82 U/L — SIGNIFICANT CHANGE UP (ref 40–120)
ALT FLD-CCNC: 16 U/L — SIGNIFICANT CHANGE UP (ref 4–33)
ANION GAP SERPL CALC-SCNC: 11 MMO/L — SIGNIFICANT CHANGE UP (ref 7–14)
AST SERPL-CCNC: 22 U/L — SIGNIFICANT CHANGE UP (ref 4–32)
BACTERIA UR CULT: SIGNIFICANT CHANGE UP
BASOPHILS # BLD AUTO: 0.03 K/UL — SIGNIFICANT CHANGE UP (ref 0–0.2)
BASOPHILS NFR BLD AUTO: 1.2 % — SIGNIFICANT CHANGE UP (ref 0–2)
BASOPHILS NFR SPEC: 0 % — SIGNIFICANT CHANGE UP (ref 0–2)
BILIRUB SERPL-MCNC: < 0.2 MG/DL — LOW (ref 0.2–1.2)
BUN SERPL-MCNC: 9 MG/DL — SIGNIFICANT CHANGE UP (ref 7–23)
CALCIUM SERPL-MCNC: 8.4 MG/DL — SIGNIFICANT CHANGE UP (ref 8.4–10.5)
CHLORIDE SERPL-SCNC: 108 MMOL/L — HIGH (ref 98–107)
CO2 SERPL-SCNC: 22 MMOL/L — SIGNIFICANT CHANGE UP (ref 22–31)
CREAT SERPL-MCNC: 0.74 MG/DL — SIGNIFICANT CHANGE UP (ref 0.5–1.3)
EOSINOPHIL # BLD AUTO: 0.22 K/UL — SIGNIFICANT CHANGE UP (ref 0–0.5)
EOSINOPHIL NFR BLD AUTO: 8.7 % — HIGH (ref 0–6)
EOSINOPHIL NFR FLD: 7 % — HIGH (ref 0–6)
GLUCOSE SERPL-MCNC: 112 MG/DL — HIGH (ref 70–99)
HCT VFR BLD CALC: 37.7 % — SIGNIFICANT CHANGE UP (ref 34.5–45)
HGB BLD-MCNC: 11.6 G/DL — SIGNIFICANT CHANGE UP (ref 11.5–15.5)
HIV 1+2 AB+HIV1 P24 AG SERPL QL IA: SIGNIFICANT CHANGE UP
IMM GRANULOCYTES NFR BLD AUTO: 0.4 % — SIGNIFICANT CHANGE UP (ref 0–1.5)
LG PLATELETS BLD QL AUTO: SLIGHT — SIGNIFICANT CHANGE UP
LYMPHOCYTES # BLD AUTO: 1.12 K/UL — SIGNIFICANT CHANGE UP (ref 1–3.3)
LYMPHOCYTES # BLD AUTO: 44.3 % — HIGH (ref 13–44)
LYMPHOCYTES NFR SPEC AUTO: 33 % — SIGNIFICANT CHANGE UP (ref 13–44)
MAGNESIUM SERPL-MCNC: 1.9 MG/DL — SIGNIFICANT CHANGE UP (ref 1.6–2.6)
MANUAL SMEAR VERIFICATION: SIGNIFICANT CHANGE UP
MCHC RBC-ENTMCNC: 25.9 PG — LOW (ref 27–34)
MCHC RBC-ENTMCNC: 30.8 % — LOW (ref 32–36)
MCV RBC AUTO: 84.2 FL — SIGNIFICANT CHANGE UP (ref 80–100)
MONOCYTES # BLD AUTO: 0.27 K/UL — SIGNIFICANT CHANGE UP (ref 0–0.9)
MONOCYTES NFR BLD AUTO: 10.7 % — SIGNIFICANT CHANGE UP (ref 2–14)
MONOCYTES NFR BLD: 4 % — SIGNIFICANT CHANGE UP (ref 2–9)
MORPHOLOGY BLD-IMP: SIGNIFICANT CHANGE UP
MYELOCYTES NFR BLD: 1 % — HIGH (ref 0–0)
NEUTROPHIL AB SER-ACNC: 39 % — LOW (ref 43–77)
NEUTROPHILS # BLD AUTO: 0.88 K/UL — LOW (ref 1.8–7.4)
NEUTROPHILS NFR BLD AUTO: 34.7 % — LOW (ref 43–77)
NEUTS BAND # BLD: 4 % — SIGNIFICANT CHANGE UP (ref 0–6)
NRBC # BLD: 0 /100WBC — SIGNIFICANT CHANGE UP
NRBC # FLD: 0 K/UL — LOW (ref 25–125)
PHOSPHATE SERPL-MCNC: 2.3 MG/DL — LOW (ref 2.5–4.5)
PLATELET # BLD AUTO: 177 K/UL — SIGNIFICANT CHANGE UP (ref 150–400)
PMV BLD: 9.8 FL — SIGNIFICANT CHANGE UP (ref 7–13)
POTASSIUM SERPL-MCNC: 4.1 MMOL/L — SIGNIFICANT CHANGE UP (ref 3.5–5.3)
POTASSIUM SERPL-SCNC: 4.1 MMOL/L — SIGNIFICANT CHANGE UP (ref 3.5–5.3)
PROT SERPL-MCNC: 7.2 G/DL — SIGNIFICANT CHANGE UP (ref 6–8.3)
RBC # BLD: 4.48 M/UL — SIGNIFICANT CHANGE UP (ref 3.8–5.2)
RBC # FLD: 13.4 % — SIGNIFICANT CHANGE UP (ref 10.3–14.5)
REVIEW TO FOLLOW: YES — SIGNIFICANT CHANGE UP
SODIUM SERPL-SCNC: 141 MMOL/L — SIGNIFICANT CHANGE UP (ref 135–145)
SPECIMEN SOURCE: SIGNIFICANT CHANGE UP
VARIANT LYMPHS # BLD: 12 % — SIGNIFICANT CHANGE UP
WBC # BLD: 2.53 K/UL — LOW (ref 3.8–10.5)
WBC # FLD AUTO: 2.53 K/UL — LOW (ref 3.8–10.5)

## 2019-03-23 PROCEDURE — 99233 SBSQ HOSP IP/OBS HIGH 50: CPT | Mod: GC

## 2019-03-23 RX ORDER — SODIUM,POTASSIUM PHOSPHATES 278-250MG
1 POWDER IN PACKET (EA) ORAL ONCE
Qty: 0 | Refills: 0 | Status: COMPLETED | OUTPATIENT
Start: 2019-03-23 | End: 2019-03-23

## 2019-03-23 RX ORDER — CEPHALEXIN 500 MG
1 CAPSULE ORAL
Qty: 8 | Refills: 0 | OUTPATIENT
Start: 2019-03-23 | End: 2019-03-26

## 2019-03-23 RX ADMIN — CEFTRIAXONE 100 GRAM(S): 500 INJECTION, POWDER, FOR SOLUTION INTRAMUSCULAR; INTRAVENOUS at 07:00

## 2019-03-23 RX ADMIN — Medication 1 PACKET(S): at 14:11

## 2019-03-23 NOTE — DISCHARGE NOTE PROVIDER - PROVIDER TOKENS
PROVIDER:[TOKEN:[[id number]]],FREE:[LAST:[[facility]],PHONE:[phone]],FAX:[(   )    -],FOLLOWUP:[1 week]],FREE:[LAST:[[facility] ([facility]PHONE:[phone]],FAX:[(   )    -],FOLLOWUP:[1 week]]

## 2019-03-23 NOTE — PROGRESS NOTE ADULT - SUBJECTIVE AND OBJECTIVE BOX
****************************  Ruth Porter, PGY-1  [facility] Pager #: [phone]  NS Pager #: [phone]  ****************************    Patient is a 43y old  Female who presents with a chief complaint of UTI (22 Mar 2019 14:14)     INTERVAL HPI/OVERNIGHT EVENTS: no acute events overnight. patient denies fevers, chills, n/v/d/c., pain      MEDICATIONS  (STANDING):  cefTRIAXone   IVPB 1 Gram(s) IV Intermittent every 24 hours    MEDICATIONS  (PRN):  acetaminophen   Tablet .. 650 milliGRAM(s) Oral every 6 hours PRN Mild Pain (1 - 3), Moderate Pain (4 - 6), Severe Pain (7 - 10)    Allergies:  morphine (Unknown)  Season allergies - nasal congestion, itchy watery eyes (Unknown)    Intolerances:        VITAL SIGNS:  T(C): 36.8 (03-22-19 @ 22:28), Max: 37.4 (03-22-19 @ 10:13)  HR: 72 (03-22-19 @ 22:28) (72 - 81)  BP: 114/64 (03-22-19 @ 22:28) (101/64 - 135/72)  RR: 15 (03-22-19 @ 22:28) (15 - 18)  SpO2: 100% (03-22-19 @ 22:28) (99% - 100%)    PHYSICAL EXAM:  General: NAD  ENMT: Moist mucous membranes  Neck: Supple  Chest: Clear to auscultation bilaterally; no rales, rhonchi, or wheezing  Heart: Regular rate and rhythm; no murmurs, rubs, or gallops  Abd: +BS, soft, nontender, nondistended, no suprapubic tenderness  Nervous System: AAOX3  Psych: Appropriate affect and mood  Ext:  no LE edema    LABS:      Ca    8.0        22 Mar 2019 06:00      CAPILLARY BLOOD GLUCOSE        BLOOD CULTURE  03-21 @ 14:27 --    NO ORGANISMS ISOLATED  NO ORGANISMS ISOLATED AT 24 HOURS  --    RADIOLOGY & ADDITIONAL TESTS:    Imaging Personally Reviewed:  [ ] YES     Consultant(s) Notes Reviewed:      Care Discussed with Consultants/Other Providers:

## 2019-03-23 NOTE — DISCHARGE NOTE PROVIDER - NSDCCPCAREPLAN_GEN_ALL_CORE_FT
PRINCIPAL DISCHARGE DIAGNOSIS  Diagnosis: Urinary tract infection  Assessment and Plan of Treatment: you came in with pain on urination. you were found to have a urinary tract infection, you were observed on antiobiotics in the emergency department but your neutrophil count was low. you clinically improved on IV cefrtiaxone. you must finish a course of oral antibiotics as prescribed.      SECONDARY DISCHARGE DIAGNOSES  Diagnosis: Neutropenia  Assessment and Plan of Treatment: you have a low neutrophil count. you don't recall a history of this. you were seen by hematology, who recommended further workup. you are told to follow up outpatient for the results of the labs that are done. PRINCIPAL DISCHARGE DIAGNOSIS  Diagnosis: Urinary tract infection  Assessment and Plan of Treatment: you came in with pain on urination. you were found to have a urinary tract infection, you were observed on antiobiotics in the emergency department but your neutrophil count was low. you clinically improved on IV cefrtiaxone. Continue taking Keflex until 3/26/19.      SECONDARY DISCHARGE DIAGNOSES  Diagnosis: Neutropenia  Assessment and Plan of Treatment: you have a low neutrophil count. you don't recall a history of this. you were seen by hematology, who recommended further workup. you are told to follow up outpatient for the results of the labs that are done.

## 2019-03-23 NOTE — DISCHARGE NOTE PROVIDER - CARE PROVIDER_API CALL
Gianni Cuellar)  Cardiology; Internal Medicine  [address] [place] [address], [address]  [place] [address]  Phone: [phone]  Fax: [phone]  Follow Up Time:     [facility],   Phone: [phone]  Fax: (   )    -  Follow Up Time: 1 week    [facility] ([facility]),   Phone: [phone]  Fax: (   )    -  Follow Up Time: 1 week

## 2019-03-23 NOTE — PROGRESS NOTE ADULT - PROBLEM SELECTOR PLAN 2
- chronic UTIs  - c/w ceftriaxone; s/p 2 doses in ED, next dose tomorrow (likely for 3 day course)    - if worsening suprapubic pain/ febrile, will broaden coverage to zosyn  - KUB: no evidence of abscess - chronic UTIs  - c/w ceftriaxone; , next dose tomorrow (likely for7 - 10 day course)  - if worsening suprapubic pain/ febrile, will broaden coverage to zosyn  - KUB: no evidence of abscess

## 2019-03-23 NOTE — DISCHARGE NOTE PROVIDER - HOSPITAL COURSE
Pt is a 42yo F with PMHx of chronic UTIs, abnormal uterine bleeding (s/p hysterectomy), ovarian cyst and numerous miscarriages (s/p salpingooophorectomy presenting with pain on urination for 3 days. Patient states that her urine has been foul smelling, cloudy appearing. She admits to body chills and night sweats during this time. Patient admits to suprapubic and lower abdominal pain during this time.  Patient admits to frequent UTIs, most recently treated with abx 7 weeks ago, unknown which antibiotic was prescribed. Patient denies any hospitalizations for this. Patient denies hematuria, nausea, vomiting, d/c.         In the ED: tempurature 99 (Tmax 101.3), , /70, saturating 98% on RA. Patient's labs were notable for leukopenia (2.07), ANC 1200, CMP unremarkable,  lactate WNL, UA grossly positive. RVP Neg, s/p ~2L LR, tylenol  650 x2, ibuprofen 600 x1, ceftriaxone 1g x. Patient transferred to CDU for further monitoring, leukopenia worsening. Patient seen by hematology. Recommending workup for neutropenia, Patient's neutropenia mildly improved to , hematology recommending following up labs: HIV, MIGUELINA, RF, hepatitis panel. Labs pending. Patient very insistent on going home, does not want to wait around the hospital. Risks explained to patient and patient understood, antibiotics with keflex 4 days to finsih a course of 7 days sent to [facility]. Pt is a 42yo F with PMHx of chronic UTIs, abnormal uterine bleeding (s/p hysterectomy), ovarian cyst and numerous miscarriages (s/p salpingooophorectomy presenting with pain on urination for 3 days. Patient states that her urine has been foul smelling, cloudy appearing. She admits to body chills and night sweats during this time. Patient admits to suprapubic and lower abdominal pain during this time.  Patient admits to frequent UTIs, most recently treated with abx 7 weeks ago, unknown which antibiotic was prescribed. Patient denies any hospitalizations for this. Patient denies hematuria, nausea, vomiting, d/c.         In the ED: tempurature 99 (Tmax 101.3), , /70, saturating 98% on RA. Patient's labs were notable for leukopenia (2.07), ANC 1200, CMP unremarkable,  lactate WNL, UA grossly positive. RVP Neg, s/p ~2L LR, tylenol  650 x2, ibuprofen 600 x1, ceftriaxone 1g x. Patient transferred to CDU for further monitoring, leukopenia worsening. Patient seen by hematology. Recommending workup for neutropenia, Patient's neutropenia mildly improved to , hematology recommending following up labs: HIV, MIGUELINA, RF, hepatitis panel. HIV negative while other labs still pending. Upon discharge patient ANC >1000. Partient afebrile and  hemodynamically stable. Patient discharged  with Keflex 7 days total antibiotic course. Patient to follow up with her PCP  on Monday.

## 2019-03-23 NOTE — PROGRESS NOTE ADULT - PROBLEM SELECTOR PLAN 3
- no hx of neutropenia, autoimmune disease, ca, no new medications   - likely in setting of acute infectious process  - neutropenia worsening; 2.07 -> 1.40; ANC 1200 -> 620  - CTM with serial CBCs  - heme onc c/s: recs appreciated; recommending MIGUELINA, RF, anti-granulocyte abs, HIV, hepatitis, EBV - no hx of neutropenia, autoimmune disease, ca, no new medications   - likely in setting of acute infectious process  - neutropenia worsening; 2.07 -> 1.40; ANC 1200 -> 620 --> 800  - CTM with serial CBCs  - heme onc c/s: recs appreciated; recommending MIGUELINA, RF, anti-granulocyte abs, HIV, hepatitis, EBV; recommending to observe for one additional day; would like to see ANC >1000

## 2019-03-24 ENCOUNTER — TRANSCRIPTION ENCOUNTER (OUTPATIENT)
Age: 44
End: 2019-03-24

## 2019-03-24 VITALS
TEMPERATURE: 98 F | DIASTOLIC BLOOD PRESSURE: 67 MMHG | SYSTOLIC BLOOD PRESSURE: 105 MMHG | OXYGEN SATURATION: 100 % | RESPIRATION RATE: 18 BRPM | HEART RATE: 67 BPM

## 2019-03-24 LAB
BASOPHILS # BLD AUTO: 0.02 K/UL — SIGNIFICANT CHANGE UP (ref 0–0.2)
BASOPHILS NFR BLD AUTO: 0.7 % — SIGNIFICANT CHANGE UP (ref 0–2)
EOSINOPHIL # BLD AUTO: 0.17 K/UL — SIGNIFICANT CHANGE UP (ref 0–0.5)
EOSINOPHIL NFR BLD AUTO: 5.6 % — SIGNIFICANT CHANGE UP (ref 0–6)
HAV IGM SER-ACNC: NONREACTIVE — SIGNIFICANT CHANGE UP
HBV CORE IGM SER-ACNC: NONREACTIVE — SIGNIFICANT CHANGE UP
HBV SURFACE AG SER-ACNC: NONREACTIVE — SIGNIFICANT CHANGE UP
HCT VFR BLD CALC: 36.1 % — SIGNIFICANT CHANGE UP (ref 34.5–45)
HCV AB S/CO SERPL IA: 0.27 S/CO — SIGNIFICANT CHANGE UP (ref 0–0.79)
HCV AB SERPL-IMP: SIGNIFICANT CHANGE UP
HGB BLD-MCNC: 11.1 G/DL — LOW (ref 11.5–15.5)
IMM GRANULOCYTES NFR BLD AUTO: 0.3 % — SIGNIFICANT CHANGE UP (ref 0–1.5)
LYMPHOCYTES # BLD AUTO: 1.48 K/UL — SIGNIFICANT CHANGE UP (ref 1–3.3)
LYMPHOCYTES # BLD AUTO: 49.2 % — HIGH (ref 13–44)
MCHC RBC-ENTMCNC: 25.9 PG — LOW (ref 27–34)
MCHC RBC-ENTMCNC: 30.7 % — LOW (ref 32–36)
MCV RBC AUTO: 84.1 FL — SIGNIFICANT CHANGE UP (ref 80–100)
MONOCYTES # BLD AUTO: 0.29 K/UL — SIGNIFICANT CHANGE UP (ref 0–0.9)
MONOCYTES NFR BLD AUTO: 9.6 % — SIGNIFICANT CHANGE UP (ref 2–14)
NEUTROPHILS # BLD AUTO: 1.04 K/UL — LOW (ref 1.8–7.4)
NEUTROPHILS NFR BLD AUTO: 34.6 % — LOW (ref 43–77)
NRBC # FLD: 0 K/UL — LOW (ref 25–125)
PLATELET # BLD AUTO: 174 K/UL — SIGNIFICANT CHANGE UP (ref 150–400)
PMV BLD: 9.7 FL — SIGNIFICANT CHANGE UP (ref 7–13)
RBC # BLD: 4.29 M/UL — SIGNIFICANT CHANGE UP (ref 3.8–5.2)
RBC # FLD: 13.3 % — SIGNIFICANT CHANGE UP (ref 10.3–14.5)
RHEUMATOID FACT SERPL-ACNC: SIGNIFICANT CHANGE UP IU/ML (ref 0–13)
WBC # BLD: 3.01 K/UL — LOW (ref 3.8–10.5)
WBC # FLD AUTO: 3.01 K/UL — LOW (ref 3.8–10.5)

## 2019-03-24 PROCEDURE — 99239 HOSP IP/OBS DSCHRG MGMT >30: CPT

## 2019-03-24 RX ORDER — CEPHALEXIN 500 MG
1 CAPSULE ORAL
Qty: 8 | Refills: 0
Start: 2019-03-24 | End: 2019-03-27

## 2019-03-24 RX ADMIN — CEFTRIAXONE 100 GRAM(S): 500 INJECTION, POWDER, FOR SOLUTION INTRAMUSCULAR; INTRAVENOUS at 06:41

## 2019-03-24 NOTE — PROGRESS NOTE ADULT - SUBJECTIVE AND OBJECTIVE BOX
Sera Duran MD PGY3  Pager [phone]    Patient is a 43y old  Female who presents with a chief complaint of UTI (23 Mar 2019 06:38)    SUBJECTIVE/OVERNIGHT EVENTS:  No acute events overnight. Reports that she would like to go home.     MEDICATIONS  (STANDING):  cefTRIAXone   IVPB 1 Gram(s) IV Intermittent every 24 hours    MEDICATIONS  (PRN):  acetaminophen   Tablet .. 650 milliGRAM(s) Oral every 6 hours PRN Mild Pain (1 - 3), Moderate Pain (4 - 6), Severe Pain (7 - 10)    CAPILLARY BLOOD GLUCOSE    I&O's Summary    Vital Signs Last 24 Hrs  T(C): 36.1 (24 Mar 2019 06:42), Max: 37.2 (23 Mar 2019 14:06)  T(F): 97 (24 Mar 2019 06:42), Max: 99 (23 Mar 2019 14:06)  HR: 64 (24 Mar 2019 06:42) (64 - 74)  BP: 100/62 (24 Mar 2019 06:42) (100/62 - 130/61)  BP(mean): --  RR: 17 (24 Mar 2019 06:42) (14 - 17)  SpO2: 100% (24 Mar 2019 06:42) (99% - 100%)    PHYSICAL EXAM:  GENERAL: NAD, well-developed  HEAD:  Atraumatic, Normocephalic  EYES: EOMI, PERRLA, conjunctiva and sclera clear  NECK: Supple, No JVD  CHEST/LUNG: Clear to auscultation bilaterally; No wheeze  HEART: Regular rate and rhythm; No murmurs, rubs, or gallops  ABDOMEN: Soft, Nontender, Nondistended; Bowel sounds present  EXTREMITIES:  2+ Peripheral Pulses, No clubbing, cyanosis, or edema  PSYCH: AAOx3  NEUROLOGY: non-focal  SKIN: No rashes or lesions    LABS                        11.1   3.01  )-----------( 174      ( 24 Mar 2019 05:00 )             36.1                         11.6   2.53  )-----------( 177      ( 23 Mar 2019 06:00 )             37.7     03-23    141  |  108<H>  |  9   ----------------------------<  112<H>  4.1   |  22  |  0.74    Ca    8.4      23 Mar 2019 06:00  Phos  2.3     03-23  Mg     1.9     03-23    TPro  7.2  /  Alb  3.5  /  TBili  < 0.2<L>  /  DBili  x   /  AST  22  /  ALT  16  /  AlkPhos  82  03-23    RADIOLOGY & ADDITIONAL TESTS:    Imaging Personally Reviewed: No new imaging     Consultant(s) Notes Reviewed:  N/A     Care Discussed with Consultants/Other Providers:

## 2019-03-24 NOTE — PROGRESS NOTE ADULT - ASSESSMENT
42yo F with PMHx of chronic UTIs, abnormal uterine bleeding (s/p hysterectomy), ovarian cyst and numerous miscarriages (s/p salpingooophorectomy presenting with pain on urination for 3 days, febrile, grossly +UA likely recurrent UTI, found to be neutropenic

## 2019-03-24 NOTE — DISCHARGE NOTE NURSING/CASE MANAGEMENT/SOCIAL WORK - NSDCDPATPORTLINK_GEN_ALL_CORE
You can access the [facility] Portal, offered by [facility], by registering with the following website: [email or web address]

## 2019-03-24 NOTE — PROGRESS NOTE ADULT - PROBLEM SELECTOR PLAN 3
no hx of neutropenia, autoimmune disease, ca, no new medications   likely in setting of acute infectious process  Improved today. Plan to d/c with PCP follow up on monday.

## 2019-03-24 NOTE — PROGRESS NOTE ADULT - ATTENDING COMMENTS
Agree with Housestaff Note Above, edits made where appropriate, case discussed with housestaff    Patient seen and examined. ANC today >1000. Patient feels well. She is medically stable for discharge today, and will follow up with her PMD on Monday, 3/25.     Attending Attestation:    Patient seen at bedside. Deemed medically stable for discharge today with outpatient follow up. Patient was counselled on importance of adherence to medications for outpatient and to follow up with scheduled appointments. Patient understood and had no further questions.    Time spent for discharge plannin min     Venkat Salcedo DO  Division of Hospital Medicine  Pager #: [phone]
Agree with Housestaff Note Above, edits made where appropriate, case discussed with housestaff    Patient seen and examined. ANC today ~ 877. Improved from yesterday. No complaints but wants to go home. Awaiting for ANC to reach 1000 prior to discharge. Continue abx, await heme recommendations     Venkat Salcedo DO  Division of Hospital Medicine  Pager #: [phone]

## 2019-03-24 NOTE — PROGRESS NOTE ADULT - PROBLEM SELECTOR PLAN 1
Sepsis now resolved. Hemodynamically stable.   Likely in setting of UTI.  BCx and UCx on admission negative.  Continue Ceftriaxone IV with transition to Keflex PO on d/c for7 day course.     - tachycardic, neutropenia, febrile likely 2/2 to UTI; lactate WNL   - blood cx NGTD  - urine cx pending  - s/p IVF 2L LR  - encourage PO hydration

## 2019-03-25 LAB
EBV EA AB TITR SER IF: POSITIVE — SIGNIFICANT CHANGE UP
EBV EA IGG SER-ACNC: NEGATIVE — SIGNIFICANT CHANGE UP
EBV PATRN SPEC IB-IMP: SIGNIFICANT CHANGE UP
EBV VCA IGG AVIDITY SER QL IA: POSITIVE — SIGNIFICANT CHANGE UP
EBV VCA IGM TITR FLD: POSITIVE — SIGNIFICANT CHANGE UP

## 2019-03-26 LAB
ANA TITR SER: NEGATIVE — SIGNIFICANT CHANGE UP
BACTERIA BLD CULT: SIGNIFICANT CHANGE UP
BACTERIA BLD CULT: SIGNIFICANT CHANGE UP

## 2019-04-29 ENCOUNTER — OUTPATIENT (OUTPATIENT)
Dept: OUTPATIENT SERVICES | Facility: HOSPITAL | Age: 44
LOS: 1 days | Discharge: HOME | End: 2019-04-29

## 2019-04-29 DIAGNOSIS — Z98.89 OTHER SPECIFIED POSTPROCEDURAL STATES: Chronic | ICD-10-CM

## 2019-05-01 ENCOUNTER — APPOINTMENT (OUTPATIENT)
Dept: HEMATOLOGY ONCOLOGY | Facility: CLINIC | Age: 44
End: 2019-05-01

## 2019-06-07 ENCOUNTER — EMERGENCY (EMERGENCY)
Facility: HOSPITAL | Age: 44
LOS: 1 days | Discharge: HOME | End: 2019-06-07
Attending: EMERGENCY MEDICINE
Payer: COMMERCIAL

## 2019-06-07 VITALS
OXYGEN SATURATION: 100 % | SYSTOLIC BLOOD PRESSURE: 140 MMHG | TEMPERATURE: 98 F | HEART RATE: 66 BPM | HEIGHT: 65 IN | RESPIRATION RATE: 16 BRPM | DIASTOLIC BLOOD PRESSURE: 81 MMHG | WEIGHT: 205.03 LBS

## 2019-06-07 VITALS
RESPIRATION RATE: 17 BRPM | TEMPERATURE: 97 F | SYSTOLIC BLOOD PRESSURE: 116 MMHG | OXYGEN SATURATION: 100 % | DIASTOLIC BLOOD PRESSURE: 74 MMHG | HEART RATE: 75 BPM

## 2019-06-07 DIAGNOSIS — Z98.89 OTHER SPECIFIED POSTPROCEDURAL STATES: Chronic | ICD-10-CM

## 2019-06-07 LAB
ALBUMIN SERPL ELPH-MCNC: 3.7 G/DL — SIGNIFICANT CHANGE UP (ref 3.5–5)
ALP SERPL-CCNC: 97 U/L — SIGNIFICANT CHANGE UP (ref 40–120)
ALT FLD-CCNC: 25 U/L DA — SIGNIFICANT CHANGE UP (ref 10–60)
ANION GAP SERPL CALC-SCNC: 6 MMOL/L — SIGNIFICANT CHANGE UP (ref 5–17)
APPEARANCE UR: CLEAR — SIGNIFICANT CHANGE UP
AST SERPL-CCNC: 17 U/L — SIGNIFICANT CHANGE UP (ref 10–40)
BASOPHILS # BLD AUTO: 0.07 K/UL — SIGNIFICANT CHANGE UP (ref 0–0.2)
BASOPHILS NFR BLD AUTO: 1.3 % — SIGNIFICANT CHANGE UP (ref 0–2)
BILIRUB SERPL-MCNC: 0.4 MG/DL — SIGNIFICANT CHANGE UP (ref 0.2–1.2)
BILIRUB UR-MCNC: NEGATIVE — SIGNIFICANT CHANGE UP
BUN SERPL-MCNC: 14 MG/DL — SIGNIFICANT CHANGE UP (ref 7–18)
CALCIUM SERPL-MCNC: 8.8 MG/DL — SIGNIFICANT CHANGE UP (ref 8.4–10.5)
CHLORIDE SERPL-SCNC: 107 MMOL/L — SIGNIFICANT CHANGE UP (ref 96–108)
CO2 SERPL-SCNC: 27 MMOL/L — SIGNIFICANT CHANGE UP (ref 22–31)
COLOR SPEC: YELLOW — SIGNIFICANT CHANGE UP
CREAT SERPL-MCNC: 0.86 MG/DL — SIGNIFICANT CHANGE UP (ref 0.5–1.3)
DIFF PNL FLD: ABNORMAL
EOSINOPHIL # BLD AUTO: 0.26 K/UL — SIGNIFICANT CHANGE UP (ref 0–0.5)
EOSINOPHIL NFR BLD AUTO: 5 % — SIGNIFICANT CHANGE UP (ref 0–6)
GLUCOSE SERPL-MCNC: 87 MG/DL — SIGNIFICANT CHANGE UP (ref 70–99)
GLUCOSE UR QL: NEGATIVE — SIGNIFICANT CHANGE UP
HCG UR QL: NEGATIVE — SIGNIFICANT CHANGE UP
HCT VFR BLD CALC: 40.1 % — SIGNIFICANT CHANGE UP (ref 34.5–45)
HGB BLD-MCNC: 12.5 G/DL — SIGNIFICANT CHANGE UP (ref 11.5–15.5)
IMM GRANULOCYTES NFR BLD AUTO: 0.2 % — SIGNIFICANT CHANGE UP (ref 0–1.5)
KETONES UR-MCNC: ABNORMAL
LEUKOCYTE ESTERASE UR-ACNC: NEGATIVE — SIGNIFICANT CHANGE UP
LYMPHOCYTES # BLD AUTO: 1.79 K/UL — SIGNIFICANT CHANGE UP (ref 1–3.3)
LYMPHOCYTES # BLD AUTO: 34.2 % — SIGNIFICANT CHANGE UP (ref 13–44)
MAGNESIUM SERPL-MCNC: 2.2 MG/DL — SIGNIFICANT CHANGE UP (ref 1.6–2.6)
MCHC RBC-ENTMCNC: 26.3 PG — LOW (ref 27–34)
MCHC RBC-ENTMCNC: 31.2 GM/DL — LOW (ref 32–36)
MCV RBC AUTO: 84.4 FL — SIGNIFICANT CHANGE UP (ref 80–100)
MONOCYTES # BLD AUTO: 0.34 K/UL — SIGNIFICANT CHANGE UP (ref 0–0.9)
MONOCYTES NFR BLD AUTO: 6.5 % — SIGNIFICANT CHANGE UP (ref 2–14)
NEUTROPHILS # BLD AUTO: 2.77 K/UL — SIGNIFICANT CHANGE UP (ref 1.8–7.4)
NEUTROPHILS NFR BLD AUTO: 52.8 % — SIGNIFICANT CHANGE UP (ref 43–77)
NITRITE UR-MCNC: NEGATIVE — SIGNIFICANT CHANGE UP
NRBC # BLD: 0 /100 WBCS — SIGNIFICANT CHANGE UP (ref 0–0)
PH UR: 8 — SIGNIFICANT CHANGE UP (ref 5–8)
PLATELET # BLD AUTO: 288 K/UL — SIGNIFICANT CHANGE UP (ref 150–400)
POTASSIUM SERPL-MCNC: 3.8 MMOL/L — SIGNIFICANT CHANGE UP (ref 3.5–5.3)
POTASSIUM SERPL-SCNC: 3.8 MMOL/L — SIGNIFICANT CHANGE UP (ref 3.5–5.3)
PROT SERPL-MCNC: 8.1 G/DL — SIGNIFICANT CHANGE UP (ref 6–8.3)
PROT UR-MCNC: NEGATIVE — SIGNIFICANT CHANGE UP
RBC # BLD: 4.75 M/UL — SIGNIFICANT CHANGE UP (ref 3.8–5.2)
RBC # FLD: 13.8 % — SIGNIFICANT CHANGE UP (ref 10.3–14.5)
SODIUM SERPL-SCNC: 140 MMOL/L — SIGNIFICANT CHANGE UP (ref 135–145)
SP GR SPEC: 1.01 — SIGNIFICANT CHANGE UP (ref 1.01–1.02)
T4 FREE+ TSH PNL SERPL: 1.86 UU/ML — SIGNIFICANT CHANGE UP (ref 0.34–4.82)
TROPONIN I SERPL-MCNC: <0.015 NG/ML — SIGNIFICANT CHANGE UP (ref 0–0.04)
UROBILINOGEN FLD QL: NEGATIVE — SIGNIFICANT CHANGE UP
WBC # BLD: 5.24 K/UL — SIGNIFICANT CHANGE UP (ref 3.8–10.5)
WBC # FLD AUTO: 5.24 K/UL — SIGNIFICANT CHANGE UP (ref 3.8–10.5)

## 2019-06-07 PROCEDURE — 80053 COMPREHEN METABOLIC PANEL: CPT

## 2019-06-07 PROCEDURE — 71046 X-RAY EXAM CHEST 2 VIEWS: CPT

## 2019-06-07 PROCEDURE — 84484 ASSAY OF TROPONIN QUANT: CPT

## 2019-06-07 PROCEDURE — 81001 URINALYSIS AUTO W/SCOPE: CPT

## 2019-06-07 PROCEDURE — 36415 COLL VENOUS BLD VENIPUNCTURE: CPT

## 2019-06-07 PROCEDURE — 93010 ELECTROCARDIOGRAM REPORT: CPT

## 2019-06-07 PROCEDURE — 96360 HYDRATION IV INFUSION INIT: CPT

## 2019-06-07 PROCEDURE — 84443 ASSAY THYROID STIM HORMONE: CPT

## 2019-06-07 PROCEDURE — 93005 ELECTROCARDIOGRAM TRACING: CPT

## 2019-06-07 PROCEDURE — 83735 ASSAY OF MAGNESIUM: CPT

## 2019-06-07 PROCEDURE — 96361 HYDRATE IV INFUSION ADD-ON: CPT

## 2019-06-07 PROCEDURE — 99283 EMERGENCY DEPT VISIT LOW MDM: CPT | Mod: 25

## 2019-06-07 PROCEDURE — 85027 COMPLETE CBC AUTOMATED: CPT

## 2019-06-07 PROCEDURE — 99285 EMERGENCY DEPT VISIT HI MDM: CPT

## 2019-06-07 PROCEDURE — 71046 X-RAY EXAM CHEST 2 VIEWS: CPT | Mod: 26

## 2019-06-07 PROCEDURE — 87086 URINE CULTURE/COLONY COUNT: CPT

## 2019-06-07 PROCEDURE — 81025 URINE PREGNANCY TEST: CPT

## 2019-06-07 RX ORDER — SODIUM CHLORIDE 9 MG/ML
500 INJECTION INTRAMUSCULAR; INTRAVENOUS; SUBCUTANEOUS ONCE
Refills: 0 | Status: COMPLETED | OUTPATIENT
Start: 2019-06-07 | End: 2019-06-07

## 2019-06-07 RX ADMIN — SODIUM CHLORIDE 500 MILLILITER(S): 9 INJECTION INTRAMUSCULAR; INTRAVENOUS; SUBCUTANEOUS at 09:23

## 2019-06-07 RX ADMIN — SODIUM CHLORIDE 500 MILLILITER(S): 9 INJECTION INTRAMUSCULAR; INTRAVENOUS; SUBCUTANEOUS at 12:28

## 2019-06-07 NOTE — ED ADULT NURSE NOTE - NSIMPLEMENTINTERV_GEN_ALL_ED
Implemented All Fall with Harm Risk Interventions:  [place] to call system. Call bell, personal items and telephone within reach. Instruct patient to call for assistance. Room bathroom lighting operational. Non-slip footwear when patient is off stretcher. Physically safe environment: no spills, clutter or unnecessary equipment. Stretcher in lowest position, wheels locked, appropriate side rails in place. Provide visual cue, wrist band, yellow gown, etc. Monitor gait and stability. Monitor for mental status changes and reorient to person, place, and time. Review medications for side effects contributing to fall risk. Reinforce activity limits and safety measures with patient and family. Provide visual clues: red socks.

## 2019-06-07 NOTE — ED ADULT NURSE NOTE - OBJECTIVE STATEMENT
Patient present to ED with c/o irregular heart beat chest pain starting this morning with sensation of tightness in the chest. Patient notes numbness to whole body prior to EMS arriving and administering oxygen. Patient notes she doesn't have pain its more of discomfort

## 2019-06-07 NOTE — ED PROVIDER NOTE - CLINICAL SUMMARY MEDICAL DECISION MAKING FREE TEXT BOX
43 yr old female with hx of palpitations for over 7 yrs presents to ed c/o palpitations while diving.  pt states usually palpitations resolve when she cough/sit down upon waking today it didn't.  feels like her heart "locks".  while driving child to school.  pt felt it again and didn't go away with tingling of legs, hot sensation, blurred vision and weak.  stopped once ems place O2.  pt had SOB as well.  no ocp use, decrease caffeine use, no leg swelling, no cp, no n/v, no hx of DVT/pe.  pt seen pcp who is a cardio and had holter monitor and echo which showed "leaky valve" and benign irregular beat per pt. not on any meds    pt with palpitations chronic worsening past 1 yr with normal cardiac work up.  will check labs, ekg cardiac monitor, cxr, re-assess.  likely PVC r/o acs vs lytes imbalance vs thyroid d/o.  pt PERC neg

## 2019-06-07 NOTE — ED ADULT NURSE NOTE - CHPI ED NUR SYMPTOMS NEG
no nausea/no vomiting/no back pain/no chills/no diaphoresis/no dizziness/no shortness of breath/no fever/no congestion/no syncope

## 2019-06-07 NOTE — ED PROVIDER NOTE - OBJECTIVE STATEMENT
43 yr old female with hx of palpitations for over 7 yrs presents to ed c/o palpitations while diving.  pt states usually palpitations resolve when she cough/sit down upon waking today it didn't.  feels like her heart "locks".  while driving child to school.  pt felt it again and didn't go away with tingling of legs, hot sensation, blurred vision and weak.  stopped once ems place O2.  pt had SOB as well.  no ocp use, decrease caffeine use, no leg swelling, no cp, no n/v, no hx of DVT/pe.  pt seen pcp who is a cardio and had holter monitor and echo which showed "leaky valve" and benign irregular beat per pt. not on any meds

## 2019-06-07 NOTE — ED PROVIDER NOTE - PROGRESS NOTE DETAILS
Edward: cardiac montior showing intermitted PVC.  otherwise normal hsu: stable.  labs reviewed.  dx palpitations with pvc.  f/u with pcp/cardio. left ambulatory.  return precautions given.

## 2019-06-08 LAB
CULTURE RESULTS: SIGNIFICANT CHANGE UP
SPECIMEN SOURCE: SIGNIFICANT CHANGE UP

## 2019-10-07 ENCOUNTER — OUTPATIENT (OUTPATIENT)
Dept: OUTPATIENT SERVICES | Facility: HOSPITAL | Age: 44
LOS: 1 days | End: 2019-10-07
Payer: COMMERCIAL

## 2019-10-07 VITALS
OXYGEN SATURATION: 100 % | SYSTOLIC BLOOD PRESSURE: 118 MMHG | WEIGHT: 205.91 LBS | TEMPERATURE: 98 F | HEIGHT: 65 IN | DIASTOLIC BLOOD PRESSURE: 76 MMHG | RESPIRATION RATE: 20 BRPM | HEART RATE: 73 BPM

## 2019-10-07 DIAGNOSIS — Z98.89 OTHER SPECIFIED POSTPROCEDURAL STATES: Chronic | ICD-10-CM

## 2019-10-07 DIAGNOSIS — N62 HYPERTROPHY OF BREAST: ICD-10-CM

## 2019-10-07 DIAGNOSIS — Z90.710 ACQUIRED ABSENCE OF BOTH CERVIX AND UTERUS: Chronic | ICD-10-CM

## 2019-10-07 DIAGNOSIS — Z01.818 ENCOUNTER FOR OTHER PREPROCEDURAL EXAMINATION: ICD-10-CM

## 2019-10-07 LAB
ANION GAP SERPL CALC-SCNC: 10 MMOL/L — SIGNIFICANT CHANGE UP (ref 5–17)
BUN SERPL-MCNC: 13 MG/DL — SIGNIFICANT CHANGE UP (ref 7–23)
CALCIUM SERPL-MCNC: 9 MG/DL — SIGNIFICANT CHANGE UP (ref 8.4–10.5)
CHLORIDE SERPL-SCNC: 104 MMOL/L — SIGNIFICANT CHANGE UP (ref 96–108)
CO2 SERPL-SCNC: 26 MMOL/L — SIGNIFICANT CHANGE UP (ref 22–31)
CREAT SERPL-MCNC: 0.71 MG/DL — SIGNIFICANT CHANGE UP (ref 0.5–1.3)
GLUCOSE SERPL-MCNC: 89 MG/DL — SIGNIFICANT CHANGE UP (ref 70–99)
HCT VFR BLD CALC: 39.4 % — SIGNIFICANT CHANGE UP (ref 34.5–45)
HGB BLD-MCNC: 11.9 G/DL — SIGNIFICANT CHANGE UP (ref 11.5–15.5)
MCHC RBC-ENTMCNC: 26.2 PG — LOW (ref 27–34)
MCHC RBC-ENTMCNC: 30.2 GM/DL — LOW (ref 32–36)
MCV RBC AUTO: 86.8 FL — SIGNIFICANT CHANGE UP (ref 80–100)
PLATELET # BLD AUTO: 288 K/UL — SIGNIFICANT CHANGE UP (ref 150–400)
POTASSIUM SERPL-MCNC: 3.9 MMOL/L — SIGNIFICANT CHANGE UP (ref 3.5–5.3)
POTASSIUM SERPL-SCNC: 3.9 MMOL/L — SIGNIFICANT CHANGE UP (ref 3.5–5.3)
RBC # BLD: 4.54 M/UL — SIGNIFICANT CHANGE UP (ref 3.8–5.2)
RBC # FLD: 13.4 % — SIGNIFICANT CHANGE UP (ref 10.3–14.5)
SODIUM SERPL-SCNC: 140 MMOL/L — SIGNIFICANT CHANGE UP (ref 135–145)
WBC # BLD: 6.91 K/UL — SIGNIFICANT CHANGE UP (ref 3.8–10.5)
WBC # FLD AUTO: 6.91 K/UL — SIGNIFICANT CHANGE UP (ref 3.8–10.5)

## 2019-10-07 PROCEDURE — 80048 BASIC METABOLIC PNL TOTAL CA: CPT

## 2019-10-07 PROCEDURE — 85027 COMPLETE CBC AUTOMATED: CPT

## 2019-10-07 PROCEDURE — G0463: CPT

## 2019-10-07 RX ORDER — SODIUM CHLORIDE 9 MG/ML
3 INJECTION INTRAMUSCULAR; INTRAVENOUS; SUBCUTANEOUS EVERY 8 HOURS
Refills: 0 | Status: DISCONTINUED | OUTPATIENT
Start: 2019-10-14 | End: 2019-11-04

## 2019-10-07 NOTE — H&P PST ADULT - RS GEN PE MLT RESP DETAILS PC
good air movement/normal/breath sounds equal/respirations non-labored/clear to auscultation bilaterally/airway patent

## 2019-10-07 NOTE — H&P PST ADULT - NSICDXPROBLEM_GEN_ALL_CORE_FT
PROBLEM DIAGNOSES  Problem: Hypertrophy of breast  Assessment and Plan: Bilateral breast reduction , Lipo bra rolls on 10/14/19.

## 2019-10-07 NOTE — H&P PST ADULT - NSANTHOSAYNRD_GEN_A_CORE
No. GRIFFIN screening performed.  STOP BANG Legend: 0-2 = LOW Risk; 3-4 = INTERMEDIATE Risk; 5-8 = HIGH Risk

## 2019-10-07 NOTE — H&P PST ADULT - NSICDXPASTSURGICALHX_GEN_ALL_CORE_FT
PAST SURGICAL HISTORY:  H/O  section     H/O dilation and curettage     H/O unilateral oophorectomy 2005    Myomectomy  &     S/P hysterectomy 2016- no oopherectomy    Tonsillectomy 2007 PAST SURGICAL HISTORY:  H/O  section     H/O dilation and curettage     H/O unilateral oophorectomy 2005    Myomectomy  &     S/P hysterectomy 2016- no oophorectomy    Tonsillectomy 2007

## 2019-10-07 NOTE — H&P PST ADULT - HISTORY OF PRESENT ILLNESS
43 yr old female with history of Seasonal allergies, Obesity, Right breast lump - 5/2018- 2.2 cn cyst , hypertrophy of bilateral breasts, Coming in for Bilateral breast reduction , Lipo bra rolls on 10/14/19.

## 2019-10-07 NOTE — H&P PST ADULT - NSICDXPASTMEDICALHX_GEN_ALL_CORE_FT
PAST MEDICAL HISTORY:  Hypertrophy of breast     Iron Deficiency Anemia resolved after hysterectomy    Obesity (BMI 30.0-34.9)     Seasonal Allergies PAST MEDICAL HISTORY:  Breast cyst, right     Hypertrophy of breast     Iron Deficiency Anemia resolved after hysterectomy    Obesity (BMI 30.0-34.9)     Seasonal Allergies

## 2019-10-10 ENCOUNTER — APPOINTMENT (OUTPATIENT)
Dept: MAMMOGRAPHY | Facility: IMAGING CENTER | Age: 44
End: 2019-10-10
Payer: COMMERCIAL

## 2019-10-10 ENCOUNTER — OUTPATIENT (OUTPATIENT)
Dept: OUTPATIENT SERVICES | Facility: HOSPITAL | Age: 44
LOS: 1 days | End: 2019-10-10
Payer: COMMERCIAL

## 2019-10-10 ENCOUNTER — APPOINTMENT (OUTPATIENT)
Dept: ULTRASOUND IMAGING | Facility: IMAGING CENTER | Age: 44
End: 2019-10-10
Payer: COMMERCIAL

## 2019-10-10 DIAGNOSIS — Z90.710 ACQUIRED ABSENCE OF BOTH CERVIX AND UTERUS: Chronic | ICD-10-CM

## 2019-10-10 DIAGNOSIS — Z98.89 OTHER SPECIFIED POSTPROCEDURAL STATES: Chronic | ICD-10-CM

## 2019-10-10 DIAGNOSIS — R92.8 OTHER ABNORMAL AND INCONCLUSIVE FINDINGS ON DIAGNOSTIC IMAGING OF BREAST: ICD-10-CM

## 2019-10-10 PROBLEM — N60.01 SOLITARY CYST OF RIGHT BREAST: Chronic | Status: ACTIVE | Noted: 2019-10-07

## 2019-10-10 PROBLEM — N62 HYPERTROPHY OF BREAST: Chronic | Status: ACTIVE | Noted: 2019-10-07

## 2019-10-10 PROCEDURE — G0279: CPT | Mod: 26

## 2019-10-10 PROCEDURE — 76641 ULTRASOUND BREAST COMPLETE: CPT | Mod: 26,50

## 2019-10-10 PROCEDURE — 77066 DX MAMMO INCL CAD BI: CPT | Mod: 26

## 2019-10-13 ENCOUNTER — TRANSCRIPTION ENCOUNTER (OUTPATIENT)
Age: 44
End: 2019-10-13

## 2019-10-13 VITALS
WEIGHT: 205.03 LBS | HEART RATE: 68 BPM | DIASTOLIC BLOOD PRESSURE: 82 MMHG | TEMPERATURE: 98 F | HEIGHT: 64.96 IN | SYSTOLIC BLOOD PRESSURE: 128 MMHG

## 2019-10-13 NOTE — PRE-ANESTHESIA EVALUATION ADULT - NSANTHPEFT_GEN_ALL_CORE
General: well appearing female, appears stated age  Cardiovascular: RRR, no murmurs appreciated  Respiratory: CTA B/L

## 2019-10-14 ENCOUNTER — RESULT REVIEW (OUTPATIENT)
Age: 44
End: 2019-10-14

## 2019-10-14 ENCOUNTER — OUTPATIENT (OUTPATIENT)
Dept: OUTPATIENT SERVICES | Facility: HOSPITAL | Age: 44
LOS: 1 days | End: 2019-10-14
Payer: COMMERCIAL

## 2019-10-14 VITALS
OXYGEN SATURATION: 100 % | SYSTOLIC BLOOD PRESSURE: 94 MMHG | HEART RATE: 73 BPM | RESPIRATION RATE: 14 BRPM | DIASTOLIC BLOOD PRESSURE: 51 MMHG

## 2019-10-14 VITALS
HEART RATE: 71 BPM | DIASTOLIC BLOOD PRESSURE: 52 MMHG | OXYGEN SATURATION: 100 % | RESPIRATION RATE: 16 BRPM | SYSTOLIC BLOOD PRESSURE: 95 MMHG | TEMPERATURE: 97 F

## 2019-10-14 VITALS — SYSTOLIC BLOOD PRESSURE: 112 MMHG | DIASTOLIC BLOOD PRESSURE: 72 MMHG

## 2019-10-14 DIAGNOSIS — Z98.89 OTHER SPECIFIED POSTPROCEDURAL STATES: Chronic | ICD-10-CM

## 2019-10-14 DIAGNOSIS — Z90.710 ACQUIRED ABSENCE OF BOTH CERVIX AND UTERUS: Chronic | ICD-10-CM

## 2019-10-14 DIAGNOSIS — N62 HYPERTROPHY OF BREAST: ICD-10-CM

## 2019-10-14 PROCEDURE — 88305 TISSUE EXAM BY PATHOLOGIST: CPT

## 2019-10-14 PROCEDURE — 19318 BREAST REDUCTION: CPT | Mod: 50

## 2019-10-14 PROCEDURE — 76641 ULTRASOUND BREAST COMPLETE: CPT

## 2019-10-14 PROCEDURE — G0279: CPT

## 2019-10-14 PROCEDURE — 88305 TISSUE EXAM BY PATHOLOGIST: CPT | Mod: 26

## 2019-10-14 PROCEDURE — 77066 DX MAMMO INCL CAD BI: CPT

## 2019-10-14 RX ORDER — FENTANYL CITRATE 50 UG/ML
25 INJECTION INTRAVENOUS
Refills: 0 | Status: DISCONTINUED | OUTPATIENT
Start: 2019-10-14 | End: 2019-10-15

## 2019-10-14 RX ORDER — OXYCODONE HYDROCHLORIDE 5 MG/1
5 TABLET ORAL ONCE
Refills: 0 | Status: DISCONTINUED | OUTPATIENT
Start: 2019-10-14 | End: 2019-10-14

## 2019-10-14 RX ORDER — LIDOCAINE HCL 20 MG/ML
0.2 VIAL (ML) INJECTION ONCE
Refills: 0 | Status: COMPLETED | OUTPATIENT
Start: 2019-10-14 | End: 2019-10-14

## 2019-10-14 RX ORDER — CEFAZOLIN SODIUM 1 G
2000 VIAL (EA) INJECTION ONCE
Refills: 0 | Status: COMPLETED | OUTPATIENT
Start: 2019-10-14 | End: 2019-10-14

## 2019-10-14 RX ORDER — ONDANSETRON 8 MG/1
4 TABLET, FILM COATED ORAL ONCE
Refills: 0 | Status: DISCONTINUED | OUTPATIENT
Start: 2019-10-14 | End: 2019-10-15

## 2019-10-14 RX ORDER — SODIUM CHLORIDE 9 MG/ML
1000 INJECTION, SOLUTION INTRAVENOUS
Refills: 0 | Status: DISCONTINUED | OUTPATIENT
Start: 2019-10-14 | End: 2019-11-04

## 2019-10-14 RX ADMIN — SODIUM CHLORIDE 3 MILLILITER(S): 9 INJECTION INTRAMUSCULAR; INTRAVENOUS; SUBCUTANEOUS at 22:19

## 2019-10-14 RX ADMIN — OXYCODONE HYDROCHLORIDE 5 MILLIGRAM(S): 5 TABLET ORAL at 22:20

## 2019-10-14 NOTE — ASU DISCHARGE PLAN (ADULT/PEDIATRIC) - CARE PROVIDER_API CALL
Eric Enrique (DO)  Plastic Surgery  [address] [place] [address]  Phone: [phone]  Fax: [phone]  Follow Up Time:

## 2019-10-14 NOTE — ASU PATIENT PROFILE, ADULT - PMH
Breast cyst, right    Hypertrophy of breast    Iron Deficiency Anemia  resolved after hysterectomy  Obesity (BMI 30.0-34.9)    Seasonal Allergies

## 2019-10-14 NOTE — ASU PATIENT PROFILE, ADULT - PSH
H/O  section    H/O dilation and curettage    H/O unilateral oophorectomy  2005  Myomectomy   &   S/P hysterectomy  2016- no oophorectomy  Tonsillectomy  2007

## 2019-10-17 LAB — SURGICAL PATHOLOGY STUDY: SIGNIFICANT CHANGE UP

## 2020-07-26 ENCOUNTER — EMERGENCY (EMERGENCY)
Facility: HOSPITAL | Age: 45
LOS: 1 days | Discharge: HOME | End: 2020-07-26
Attending: EMERGENCY MEDICINE | Admitting: EMERGENCY MEDICINE
Payer: COMMERCIAL

## 2020-07-26 VITALS
OXYGEN SATURATION: 100 % | DIASTOLIC BLOOD PRESSURE: 53 MMHG | SYSTOLIC BLOOD PRESSURE: 150 MMHG | RESPIRATION RATE: 16 BRPM | TEMPERATURE: 99 F | HEART RATE: 93 BPM

## 2020-07-26 DIAGNOSIS — Z98.89 OTHER SPECIFIED POSTPROCEDURAL STATES: Chronic | ICD-10-CM

## 2020-07-26 DIAGNOSIS — Z90.710 ACQUIRED ABSENCE OF BOTH CERVIX AND UTERUS: Chronic | ICD-10-CM

## 2020-07-26 PROCEDURE — 99283 EMERGENCY DEPT VISIT LOW MDM: CPT

## 2020-07-26 RX ORDER — CIPROFLOXACIN AND DEXAMETHASONE 3; 1 MG/ML; MG/ML
2 SUSPENSION/ DROPS AURICULAR (OTIC) ONCE
Refills: 0 | Status: COMPLETED | OUTPATIENT
Start: 2020-07-26 | End: 2020-07-26

## 2020-07-26 NOTE — ED ADULT TRIAGE NOTE - CHIEF COMPLAINT QUOTE
"I heard a pop and I have ear pain in the RT side, I noticed pain around my jaw." I feel like my LT ear is clogged. Recently flew in from [place] 2 days prior. Denies blood/drainage from ear. Endorses some relief with morning. Denies sick contacts. Denies Med hx "I heard a pop and I have ear pain in the RT side, I noticed pain around my jaw." I feel like my LT ear is clogged. Recently flew in from [place] 2 days prior. Denies blood/drainage from ear. Endorses some relief with motrin. Denies sick contacts. Denies Med hx

## 2020-07-27 RX ORDER — CIPROFLOXACIN AND DEXAMETHASONE 3; 1 MG/ML; MG/ML
4 SUSPENSION/ DROPS AURICULAR (OTIC)
Qty: 15 | Refills: 0
Start: 2020-07-27 | End: 2020-08-02

## 2020-07-27 RX ADMIN — CIPROFLOXACIN AND DEXAMETHASONE 2 DROP(S): 3; 1 SUSPENSION/ DROPS AURICULAR (OTIC) at 00:33

## 2020-07-27 NOTE — ED PROVIDER NOTE - CLINICAL SUMMARY MEDICAL DECISION MAKING FREE TEXT BOX
44 yr old F with significant right otitis externa. Due to the swelling, will need ear wick with Ciprodex drops. No indication for oral abx at this time. F/u ENT.

## 2020-07-27 NOTE — ED PROVIDER NOTE - PATIENT PORTAL LINK FT
You can access the FollowMyHealth Patient Portal offered by [facility] by registering at the following website: [email or web address]. By joining "[employer]’s FollowMyHealth portal, you will also be able to view your health information using other applications (apps) compatible with our system.

## 2020-07-27 NOTE — ED PROVIDER NOTE - OBJECTIVE STATEMENT
44 yr old F with no significant PMHx presents to the ED with 4-5 days of progressive right ear pain. Pain is severe and radiates to the rest of her face and jaw. Decreased hearing from that ear. Pt states symptoms started after hearing a cracking noise while she was sleeping in that ear. Pain is slightly improved by ibuprofen, slight pain in left ear as well. No known trauma, no swimming, no fevers.

## 2020-07-27 NOTE — ED PROVIDER NOTE - CARE PROVIDER_API CALL
Jeremías Julio  OTOLARYNGOLOGY  [address] [place] [address], [place] [address]  Phone: [phone]  Fax: [phone]  Follow Up Time: 7-10 Days

## 2020-07-27 NOTE — ED PROVIDER NOTE - PHYSICAL EXAMINATION
Gen:  Well appearning in NAD  Head:  NC/AT  Resp: No distress   Ext: no deformities  Skin: warm and dry as visualized   ENT: right TM not able to be visualized as the EAC is swollen shut. Entire ear ttp. Left ear TM able to be visualized and intact. No effusion.

## 2020-12-16 PROBLEM — N39.0 ACUTE UTI: Status: RESOLVED | Noted: 2018-04-24 | Resolved: 2020-12-16

## 2021-03-16 ENCOUNTER — APPOINTMENT (OUTPATIENT)
Dept: ULTRASOUND IMAGING | Facility: CLINIC | Age: 46
End: 2021-03-16
Payer: COMMERCIAL

## 2021-03-16 ENCOUNTER — OUTPATIENT (OUTPATIENT)
Dept: OUTPATIENT SERVICES | Facility: HOSPITAL | Age: 46
LOS: 1 days | End: 2021-03-16
Payer: COMMERCIAL

## 2021-03-16 DIAGNOSIS — R10.11 RIGHT UPPER QUADRANT PAIN: ICD-10-CM

## 2021-03-16 DIAGNOSIS — Z98.89 OTHER SPECIFIED POSTPROCEDURAL STATES: Chronic | ICD-10-CM

## 2021-03-16 DIAGNOSIS — Z90.710 ACQUIRED ABSENCE OF BOTH CERVIX AND UTERUS: Chronic | ICD-10-CM

## 2021-03-16 DIAGNOSIS — R19.01 RIGHT UPPER QUADRANT ABDOMINAL SWELLING, MASS AND LUMP: ICD-10-CM

## 2021-03-16 PROCEDURE — 76700 US EXAM ABDOM COMPLETE: CPT | Mod: 26

## 2021-03-16 PROCEDURE — 76700 US EXAM ABDOM COMPLETE: CPT

## 2021-03-16 PROCEDURE — 76536 US EXAM OF HEAD AND NECK: CPT | Mod: 26

## 2021-03-16 PROCEDURE — 76536 US EXAM OF HEAD AND NECK: CPT

## 2021-11-04 ENCOUNTER — APPOINTMENT (OUTPATIENT)
Dept: OTOLARYNGOLOGY | Facility: CLINIC | Age: 46
End: 2021-11-04
Payer: COMMERCIAL

## 2021-11-04 VITALS
DIASTOLIC BLOOD PRESSURE: 81 MMHG | BODY MASS INDEX: 40.48 KG/M2 | WEIGHT: 220 LBS | HEIGHT: 62 IN | TEMPERATURE: 97.8 F | HEART RATE: 77 BPM | SYSTOLIC BLOOD PRESSURE: 135 MMHG

## 2021-11-04 DIAGNOSIS — H90.5 UNSPECIFIED SENSORINEURAL HEARING LOSS: ICD-10-CM

## 2021-11-04 DIAGNOSIS — H90.3 SENSORINEURAL HEARING LOSS, BILATERAL: ICD-10-CM

## 2021-11-04 DIAGNOSIS — M26.629 ARTHRALGIA OF TEMPOROMANDIBULAR JOINT,: ICD-10-CM

## 2021-11-04 PROCEDURE — 92567 TYMPANOMETRY: CPT

## 2021-11-04 PROCEDURE — 92557 COMPREHENSIVE HEARING TEST: CPT

## 2021-11-04 PROCEDURE — 99204 OFFICE O/P NEW MOD 45 MIN: CPT | Mod: 25

## 2021-11-04 RX ORDER — NITROFURANTOIN MONOHYDRATE/MACROCRYSTALLINE 25; 75 MG/1; MG/1
100 CAPSULE ORAL
Qty: 1 | Refills: 0 | Status: COMPLETED | COMMUNITY
Start: 2018-06-01 | End: 2021-11-04

## 2021-11-04 RX ORDER — CIPROFLOXACIN HYDROCHLORIDE 500 MG/1
500 TABLET, FILM COATED ORAL
Qty: 6 | Refills: 0 | Status: COMPLETED | COMMUNITY
Start: 2018-04-30 | End: 2021-11-04

## 2021-11-04 NOTE — REVIEW OF SYSTEMS
[As Noted in HPI] : as noted in HPI [Hearing Loss] : hearing loss [Dizziness] : dizziness [Negative] : Heme/Lymph

## 2021-11-05 PROBLEM — M26.629 TMJ SYNDROME: Status: ACTIVE | Noted: 2021-11-05

## 2021-11-05 NOTE — PHYSICAL EXAM
[Normal] : inferior turbinates and middle turbinates are normal [de-identified] : Pain and clicking in TMJ left side

## 2021-11-05 NOTE — DATA REVIEWED
[de-identified] : R= WNL up to 750Hz, followed by a mild sloping to mod-severe/ moderate SNHL.\par L=  WNL up to 1kHz, followed by a moderate sloping to mod-severe SNHL.\par Type A tymps bilaterally.

## 2021-11-05 NOTE — HISTORY OF PRESENT ILLNESS
[de-identified] : 45 year old female presents for evaluation of hearing loss. Patient has had hearing loss for 30 years due to standing too close to a speaker progressively getting worse. Reports bilateral clogged ears, worse in the past 6 months in left ear, muffled hearing when she opens her jaw or laying down. Report had ear infection 1st time last ear, treated with medication with ear wick and resolved. Reports family history of hearing loss (grandmother) but thinks may be due to life events. Reports intermittent clear otorrhea. Reports loses balance often and has frequent headaches.. Denies otalgia, tinnitus, ear surgeries.  Last audiogram conducted 2012. States has MRI brain scheduled for 11/15/2021.

## 2021-11-15 ENCOUNTER — APPOINTMENT (OUTPATIENT)
Dept: ULTRASOUND IMAGING | Facility: CLINIC | Age: 46
End: 2021-11-15
Payer: COMMERCIAL

## 2021-11-15 ENCOUNTER — OUTPATIENT (OUTPATIENT)
Dept: OUTPATIENT SERVICES | Facility: HOSPITAL | Age: 46
LOS: 1 days | End: 2021-11-15
Payer: COMMERCIAL

## 2021-11-15 DIAGNOSIS — Z98.89 OTHER SPECIFIED POSTPROCEDURAL STATES: Chronic | ICD-10-CM

## 2021-11-15 DIAGNOSIS — R19.00 INTRA-ABDOMINAL AND PELVIC SWELLING, MASS AND LUMP, UNSPECIFIED SITE: ICD-10-CM

## 2021-11-15 DIAGNOSIS — R10.11 RIGHT UPPER QUADRANT PAIN: ICD-10-CM

## 2021-11-15 DIAGNOSIS — Z90.710 ACQUIRED ABSENCE OF BOTH CERVIX AND UTERUS: Chronic | ICD-10-CM

## 2021-11-15 PROCEDURE — 76700 US EXAM ABDOM COMPLETE: CPT

## 2021-11-15 PROCEDURE — 76700 US EXAM ABDOM COMPLETE: CPT | Mod: 26

## 2021-11-30 NOTE — DISCHARGE NOTE PROVIDER - NSDCCAREPROVSEEN_GEN_ALL_CORE_FT
[facility],  2 Mucosal Advancement Flap Text: Given the location of the defect, shape of the defect and the proximity to free margins a mucosal advancement flap was deemed most appropriate. Incisions were made with a 15 blade scalpel in the appropriate fashion along the cutaneous vermilion border and the mucosal lip. The remaining actinically damaged mucosal tissue was excised.  The mucosal advancement flap was then elevated to the gingival sulcus with care taken to preserve the neurovascular structures and advanced into the primary defect. Care was taken to ensure that precise realignment of the vermilion border was achieved.

## 2022-05-27 NOTE — PRE-ANESTHESIA EVALUATION ADULT - HEIGHT IN CM
Patient is checking status of refill. Patient stated she works all weekend and would like to have this available for  before the weekend.    165

## 2022-06-08 NOTE — ED PROVIDER NOTE - PSH
Dr Sexton updated with message.  Carolyn Hearn RN, BSN, OCN    
Follow up call to pt to see if Hospice came to home.  Pt states she had a visit from [facility] recently and pt will start services after her procedure on 6/14/2022.  Pt scheduled for laryngoscopy and injection with Dr Clifford, voice quite hoarse during our conversation.  Support given to pt and instructed to call back if we can support her in any way. Pt aware of plan and no further questions at this time.    Will notify Dr Sexton with update.    Carolyn Hearn, RN, BSN, OCN    
H/O  section    H/O dilation and curettage    H/O dilation and curettage    H/O unilateral oophorectomy  2005  Myomectomy   &   Tonsillectomy

## 2022-07-21 NOTE — DISCHARGE NOTE PROVIDER - NSDCADMDATE_GEN_ALL_CORE_FT
22-Mar-2019 18:17 [Sclera] : the sclera and conjunctiva were normal [PERRL With Normal Accommodation] : pupils were equal in size, round, reactive to light [Extraocular Movements] : extraocular movements were intact [Neck Appearance] : the appearance of the neck was normal [Neck Cervical Mass (___cm)] : no neck mass was observed [Jugular Venous Distention Increased] : there was no jugular-venous distention [Thyroid Diffuse Enlargement] : the thyroid was not enlarged [Auscultation Breath Sounds / Voice Sounds] : lungs were clear to auscultation bilaterally [Heart Rate And Rhythm] : heart rate was normal and rhythm regular [Heart Sounds] : normal S1 and S2 [Heart Sounds Gallop] : no gallops [Murmurs] : no murmurs [Heart Sounds Pericardial Friction Rub] : no pericardial rub [Edema] : there was no peripheral edema [Bowel Sounds] : normal bowel sounds [Abdomen Soft] : soft [Abdomen Tenderness] : non-tender [Abdomen Mass (___ Cm)] : no abdominal mass palpated [Costovertebral Angle Tenderness] : no CVA tenderness [Cervical Lymph Nodes Enlarged Posterior Bilaterally] : posterior cervical [Cervical Lymph Nodes Enlarged Anterior Bilaterally] : anterior cervical [Supraclavicular Lymph Nodes Enlarged Bilaterally] : supraclavicular [Musculoskeletal - Swelling] : no joint swelling [Nail Clubbing] : no clubbing  or cyanosis of the fingernails [Motor Tone] : muscle strength and tone were normal [] : no rash [FreeTextEntry1] : confused, most nonverbal but follows commands well, will answer yes and no appropriately

## 2022-09-12 NOTE — ED PROVIDER NOTE - DATE/TIME 2
[facility] Occupational Therapy  Initial Evaluation     Name: Jo-Ann Escobedo  Clinic Number: [id number]    Therapy Diagnosis:   Encounter Diagnosis   Name Primary?    Left carpal tunnel syndrome      Physician: Sidney Rowell MD    Physician Orders:  eval and treat  Medical Diagnosis: carpal tunnel syndrome and long finger trigger finger     Surgical Procedure/ Date :injection to left LF 3 weeks ago   Evaluation Date: 9/12/2022  Insurance:[employer]   Insurance Authorization period Expiration: 10/10/22   Plan of Care Certification Period: 10/30/22     Visit # / Visits Authortized: 1 / 20  Time In:11:00 am   Time Out: 12:00 am   Total Billable Time: 60  minutes    Precautions: Standard    Subjective     Involved Side:  left  Dominant Side: Right  Date of Onset: several months ago    Had carpal tunnel surgery 2 years ago and reports that it did not get better.   History of Current Condition: reports that no change in symptoms , both her hands and feet are going numb ht     Imaging:  xray   Previous Therapy: injection left LF     Patient's Goals for Therapy: LF stop sticking     Pain:  Functional Pain Scale Rating 0-10:   6/10 on average  6/10 at best  7/10 at worst  Locationleft: LONG finger        Description: Throbbing  Aggravating Factors: Bending  Easing Factors: massage    Household task   Working presently: unemployed   Duties: household task       Functional Limitations/Social History:    Previous functional status includes: Independent with all ADLs.     Current FunctionalStatus   Home/Living environment : lives alone      Limitation of Functional Status as follows:   ADLs/IADLs:     - Feeding:Not Necessary    - Bathing:Not Necessary    - Dressing/Grooming: Not Necessary    - Driving: Not Necessary           Past Medical History/Physical Systems Review:   Jo-Ann Escobedo  has a past medical history of Acute blood loss anemia, After-cataract of both eyes - Both Eyes, Arthritis of foot,  Cholelithiasis, Deaf, left, Diverticulitis of large intestine without perforation or abscess with bleeding, Diverticulosis, Ductal carcinoma in situ (DCIS) of right breast, Encounter for blood transfusion, Essential hypertension, Gastric nodule, GI bleed, Hearing loss in right ear, Hematochezia, Hematochezia, Hypothyroidism, postsurgical, Mixed hyperlipidemia, Multinodular goiter, Paget's disease of bone, SCC (squamous cell carcinoma), and Squamous Cell Carcinoma.    Jo-Ann Escobedo  has a past surgical history that includes Hysterectomy; Eye surgery; Spine surgery; Mastectomy; Total thyroidectomy; Cataract extraction w/  intraocular lens implant (Bilateral); Knee arthroscopy (N/A); Colonoscopy (N/A, 4/15/2019); Injection of anesthetic agent around medial branch nerves innervating lumbar facet joint (Bilateral, 6/7/2019); Excisional biopsy (Right, 6/27/2019); YAG Laser Capsulotomy  (Left, 07/29/2019); Esophagogastroduodenoscopy (N/A, 12/7/2019); Colonoscopy (N/A, 12/9/2019); Endoscopic ultrasound of upper gastrointestinal tract (N/A, 1/22/2020); Breast biopsy (Right, 2019); Oophorectomy; Breast lumpectomy (Right, 2019); Carpal tunnel release (Left, 6/5/2020); Injection of steroid (Right, 6/5/2020); Unilateral mastectomy (Right, 7/30/2020); Injection for sentinel node identification (Right, 7/30/2020); Taft lymph node biopsy (Right, 7/30/2020); Epidural steroid injection (Bilateral, 12/17/2021); and Transforaminal epidural injection of steroid (Bilateral, 5/27/2022).    Jo-Ann has a current medication list which includes the following prescription(s): acetaminophen, ascorbic acid (vitamin c), cholecalciferol (vitamin d3), co-enzyme q-10, fenofibrate, lactobacillus combination no.8, levothyroxine, losartan, and turmeric root extract.    Review of patient's allergies indicates:   Allergen Reactions    Voltaren [diclofenac sodium] Other (See Comments)     Hematochezia and hospitalization for hematochezia.    Codeine  Diarrhea and Hallucinations    Niacin preparations      Redness, warming          Objective     Observation/Appearance:  Skin intact     Edema. Measured in centimeters. None noted       Hand ROM. Measured in degrees. Full ROM all digits      9/12/2022 9/12/2022       Left Right               WRIST          flexion 35 45      Ext  35 45      RD 15 15      UD 15 15      Pro 90 90      Sup  90 90                Left LF          MP  0/90       PIP locks every 4-5 times  aday even after injection .  0/50       DIP  0/50                        Sensation  Median Nerve Distribution 9/12/2022 9/12/2022    Left Right   [place] Dejan     Normal 1.65-2.83     Diminished Light Touch 3.22-3.61 X     Diminished Protective 3.84-4.31     Loss of Protective 4.56-6.65     Untestable >6.65     2 Point Discrimination     Static     Dynamic         Special Tests:   Left   9/12/2022   Thumb CMC Grind Test    Finkelstein's Test    Phalen's Test    Tinel's Test    Amrit's Test    Extrinsic Tightness Test    Intrinsic Tightness Test    ORL Test    Froment's Sign    Alyssa's Sign     Egawa Sign     Clamp Sign     Scaphoid Thrust Test    Linscheid's Test    Metacarpal Stress Test    Piano Key Test    ECU Synergy Test    Ulnar Compression Test    TFCC Load Test    Ulnocarpal Stress Test    Midcarpal Shift Test    Pisiform Boost Test         Strength (Dyanmometer) and Pinch Strength (Pinch Gauge)  Measured in pounds and psi. Average of three trials.   9/12/2022 9/12/2022        Left Right        Rung II 28 40       Key Pinch 8 10       3pt Pinch 8 12       2pt Pinch 7 6           CMS Impairment/Limitation/Restriction for FOTO initial  Survey    Therapist reviewed FOTO scores for Jo-Ann Escobedo on 9/12/2022.   FOTO documents entered into [employer] - see Media section.                    9/12/2022   Limitation Score:  33 %  Category: Self care      60    Treatment     Treatment Time In: 11:00 am   Treatment Time Out: 12:00 am   Total Treatment  time separate from Evaluation time:60      Fabricated pulley ring for left LF , issued to wear during the day    Instructed to wear carpal tunnel wrist brace at night time       Jo-Ann received the following manual therapy techniques for 8 minutes:   -massage over A 1 pulley       Jo-Ann received therapeutic exercises for 15  minutes including:     Joint blocking Long finger pip joint, TGE all motions  10 reps each    Next visit demonstrate nerve glides     Home Exercise Program/Education:  Issued HEP:  left long finger PIP joint , TGE and wear wrist brace at night and pulley ring splint during the day      and educated on modality use for pain management . Exercises were reviewed and Jo-Ann was able to demonstrate them prior to the end of the session.   Pt received a written copy of exercises to perform at home. Jo-Ann demonstrated good  understanding of the education provided.  Pt was advised to perform these exercises free of pain, and to stop performing them if pain occurs.    Patient/Family Education: role of OT, goals for OT, scheduling/cancellations - pt verbalized understanding. Discussed insurance limitations with patient.    Additional Education provided: none     Assessment     Jo-Ann Escobedo is a 79 y.o. female referred to outpatient occupational/hand therapy and presents with a medical diagnosis of left CTS and and left LF trigger  resulting in sticking and  and demonstrates limitations as described in the chart below. Following  medical record review it is determined that pt will benefit from occupational therapy services in order to maximize pain free and/or functional use of left UE .     The patient's rehab potential is Good.     Anticipated barriers to occupational therapy:  none   Pt has no cultural, educational or language barriers to learning provided.    Profile and History Assessment of Occupational Performance Level of Clinical Decision Making Complexity Score   Occupational Profile:   Jo-Ann  LUCIA Escobedo is a 79 y.o. female who lives with their family and is currently employed . Jo-Ann Escobedo has difficulty with  Dressing, fine motor to manage clothing   housework/household chores  affecting his/her daily functional abilities. His/her main goal for therapy is  pain free .     Comorbidities:    has a past medical history of Acute blood loss anemia, After-cataract of both eyes - Both Eyes, Arthritis of foot, Cholelithiasis, Deaf, left, Diverticulitis of large intestine without perforation or abscess with bleeding, Diverticulosis, Ductal carcinoma in situ (DCIS) of right breast, Encounter for blood transfusion, Essential hypertension, Gastric nodule, GI bleed, Hearing loss in right ear, Hematochezia, Hematochezia, Hypothyroidism, postsurgical, Mixed hyperlipidemia, Multinodular goiter, Paget's disease of bone, SCC (squamous cell carcinoma), and Squamous Cell Carcinoma.    Medical and Therapy History Review:   Brief               Performance Deficits    Physical:  No Deficits    Cognitive:  No Deficits    Psychosocial:    No Deficits     Clinical Decision Making:  low    Assessment Process:  Problem-Focused Assessments    Modification/Need for Assistance:  Minimal-Moderate Modifications/Assistance    Intervention Selection:  Limited Treatment Options       low  Based on PMHX, co morbidities , data from assessments and functional level of assistance required with task and clinical presentation directly impacting function.       The following goals were discussed with the patient and patient is in agreement with them as to be addressed in the treatment plan.          GOALS: 6 weeks. Pt agrees with goals set.      Short Term (4 weeks on 10/12/22 ):  1)   Patient to be IND with HEP and modalities for pain management, progressing   2)   Increase ROM 10 degrees  For  wrist  motion to increase functional hand use for left UE , progressing   3)   Increase  strength by  6 lbs. to grasp left hand , progressing   4)    Increase pinch 1-3 psis for  lateral pinch , progressing   5)   Decrease edema .1-2cm to increase joint mobility /flexibility for improved overall functional hand use. , progressing   6)   Patient to be IND with HEP and modalities for pain/edema managment.    Long Term (by discharge):  1)   Pt will report 0 out of 10 pain with left Long  finger and  wrist motions progressing   2)   Patient to score of 20%  on FOTO to demonstrate improved perception of functionalleft hand/ arm  Use. Progressing   3)   Pt will return to prior level of function for ADLs and household management, progressing              Plan   Certification Period/Plan of care expiration: 9/12/2022 to 10/31/22 .    Outpatient Occupational Therapy 1 times weekly for 4 weeks may include the following interventions: Paraffin, Fluidotherapy, Manual therapy/joint mobilizations, Modalities for pain management, US 3 mhz, Therapeutic exercises/activities., and Strengthening.      Evelyn Sweet, OT             07-Jun-2019 12:08

## 2022-10-19 NOTE — PROGRESS NOTE ADULT - PROBLEM SELECTOR PROBLEM 1
Sepsis Minoxidil Counseling: Minoxidil is a topical medication which can increase blood flow where it is applied. It is uncertain how this medication increases hair growth. Side effects are uncommon and include stinging and allergic reactions.

## 2023-05-25 NOTE — ED CDU PROVIDER INITIAL DAY NOTE - NEURO NEGATIVE STATEMENT, MLM
"Daily Note     Today's date: 2023  Patient name: Chi Rodas  : 1992  MRN: [id number]  Referring provider: Diaz Morgan DO  Dx:   Encounter Diagnosis     ICD-10-CM    1  Tendinitis of right rotator cuff  M75 79                      Subjective: Pt reports that her shoulder was sore for 2 days after last visit  Objective: See treatment diary below      Assessment: Tolerated treatment well  Patient demonstrated fatigue post treatment, exhibited good technique with therapeutic exercises and would benefit from continued PT  Pt had end range limitations with PROM  Continue to progress as able  Plan: Continue per plan of care  Progress treatment as tolerated         Precautions: N/A      Manuals           PROM R shoulder                                                    Neuro Re-Ed             Pt education about posture, RTC, POC, HEP 10'            Corner pinches 1x10 5\" 2x10 5\" 2x10 5\"          Chin tucks 1x10 5\" 2x10 5\" 2x10 5\"          Serratus punch  supine 2x10 3\" supine 2x10 3\"          No moneys  [place] 2x10  orange 3x10                                     Ther Ex             Table slides flex 1x10 5\"  [id number] ea 5\" 1x15 5\"          ER AAROM c cane 1x10 5\" 2x10 5\" 2x10 5\"          ER/abd isometric  abd 1x10 5\" 1x15 5\" ea          Pulleys  3' scaption  3' scaptio          Cane flexion AAROM  supine 2x10  supine 2x10                                                  Ther Activity             Box lifts  + squat mechanics  2'  Box only 10x + squat mechanics  2'  Box only 10x                       Gait Training                                       Modalities                                            "
no loss of consciousness, no gait abnormality, no headache, no sensory deficits, and no weakness.

## 2024-02-08 NOTE — ED PROVIDER NOTE - INTERNATIONAL TRAVEL DAYS 1
PHYSICAL / OCCUPATIONAL THERAPY - DAILY TREATMENT NOTE     Patient Name: Liset Costa    Date: 2024    : 1987  Insurance: Payor: [employer] / Plan: [employer] / Product Type: *No Product type* /      Patient  verified Yes     Visit #   Current / Total 5 24   Time   In / Out 9:11 10:02   Pain   In / Out 2 0   Subjective Functional Status/Changes: Patient reports pain is average.    Patient reports mild improvement in shoulder fatigue since beginning therapy   Changes to:  Allergies, Med Hx, Sx Hx?   no       TREATMENT AREA =  Right shoulder pain [M25.511]  Left shoulder pain [M25.512]    OBJECTIVE    Modalities Rationale:     decrease inflammation, decrease pain, and increase tissue extensibility to improve patient's ability to progress to PLOF and address remaining functional goals.    10 min [x]  Vasopneumatic Device, press/temp:     min []  Whirlpool / Fluido:    If using vaso (only need to measure limb vaso being performed on)      pre-treatment girth : 38.1cm      post-treatment girth : 38 cm      measured at (landmark location) :  left acromion    min []  Other:    Skin assessment post-treatment (if applicable):    []  intact    [x]  redness- no adverse reaction                 []redness - adverse reaction:         Therapeutic Procedures:  Tx Min Billable or 1:1 Min (if diff from Tx Min) Procedure, Rationale, Specifics   13  [id number] Therapeutic Exercise (timed):  increase ROM, strength, coordination, balance, and proprioception to improve patient's ability to progress to PLOF and address remaining functional goals. (see flow sheet as applicable)    Details if applicable:       12  [id number] Neuromuscular Re-Education (timed):  improve balance, coordination, kinesthetic sense, posture, core stability and proprioception to improve patient's ability to develop conscious control of individual muscles and awareness of position of extremities in order to progress to PLOF and address remaining functional 
0-6 days (Ena Rico)